# Patient Record
Sex: FEMALE | Race: WHITE
[De-identification: names, ages, dates, MRNs, and addresses within clinical notes are randomized per-mention and may not be internally consistent; named-entity substitution may affect disease eponyms.]

---

## 2018-04-11 ENCOUNTER — HOSPITAL ENCOUNTER (EMERGENCY)
Dept: HOSPITAL 25 - ED | Age: 83
Discharge: LEFT BEFORE DISCHARGE | End: 2018-04-11
Payer: MEDICARE

## 2018-04-11 VITALS — SYSTOLIC BLOOD PRESSURE: 138 MMHG | DIASTOLIC BLOOD PRESSURE: 57 MMHG

## 2018-04-11 DIAGNOSIS — R53.1: ICD-10-CM

## 2018-04-11 DIAGNOSIS — R19.7: ICD-10-CM

## 2018-04-11 DIAGNOSIS — R11.10: Primary | ICD-10-CM

## 2018-04-11 DIAGNOSIS — Z53.21: ICD-10-CM

## 2018-05-03 ENCOUNTER — HOSPITAL ENCOUNTER (EMERGENCY)
Dept: HOSPITAL 25 - ED | Age: 83
Discharge: NURSING, REHAB OR LONG-TERM CARE | End: 2018-05-03
Payer: MEDICARE

## 2018-05-03 VITALS — DIASTOLIC BLOOD PRESSURE: 44 MMHG | SYSTOLIC BLOOD PRESSURE: 121 MMHG

## 2018-05-03 DIAGNOSIS — Z87.19: ICD-10-CM

## 2018-05-03 DIAGNOSIS — Z87.891: ICD-10-CM

## 2018-05-03 DIAGNOSIS — R10.32: ICD-10-CM

## 2018-05-03 DIAGNOSIS — K57.92: Primary | ICD-10-CM

## 2018-05-03 LAB
BASOPHILS # BLD AUTO: 0.1 10^3/UL (ref 0–0.2)
EOSINOPHIL # BLD AUTO: 0.1 10^3/UL (ref 0–0.6)
HCT VFR BLD AUTO: 37 % (ref 35–47)
HGB BLD-MCNC: 12.6 G/DL (ref 12–16)
LYMPHOCYTES # BLD AUTO: 1.6 10^3/UL (ref 1–4.8)
MCH RBC QN AUTO: 29 PG (ref 27–31)
MCHC RBC AUTO-ENTMCNC: 34 G/DL (ref 31–36)
MCV RBC AUTO: 85 FL (ref 80–97)
MONOCYTES # BLD AUTO: 0.6 10^3/UL (ref 0–0.8)
NEUTROPHILS # BLD AUTO: 8.8 10^3/UL (ref 1.5–7.7)
NRBC # BLD AUTO: 0 10^3/UL
NRBC BLD QL AUTO: 0
PLATELET # BLD AUTO: 224 10^3/UL (ref 150–450)
RBC # BLD AUTO: 4.36 10^6/UL (ref 4–5.4)
WBC # BLD AUTO: 11.1 10^3/UL (ref 3.5–10.8)

## 2018-05-03 PROCEDURE — 86140 C-REACTIVE PROTEIN: CPT

## 2018-05-03 PROCEDURE — 85025 COMPLETE CBC W/AUTO DIFF WBC: CPT

## 2018-05-03 PROCEDURE — 36415 COLL VENOUS BLD VENIPUNCTURE: CPT

## 2018-05-03 PROCEDURE — 80053 COMPREHEN METABOLIC PANEL: CPT

## 2018-05-03 PROCEDURE — 83605 ASSAY OF LACTIC ACID: CPT

## 2018-05-03 PROCEDURE — 83690 ASSAY OF LIPASE: CPT

## 2018-05-03 PROCEDURE — 74177 CT ABD & PELVIS W/CONTRAST: CPT

## 2018-05-03 PROCEDURE — 99282 EMERGENCY DEPT VISIT SF MDM: CPT

## 2018-05-03 NOTE — RAD
INDICATION: Left lower quadrant pain     



COMPARISON: CT abdomen and pelvis February 20, 2007

 

TECHNIQUE: Axial source images were obtained from the hemidiaphragms to the symphysis

pubis following administration intravenous contrast.  88 cc Omnipaque 300 was utilized.

Coronal and sagittal reconstructed images were acquired.



Lung bases: There is mild airspace disease in the lingular segment. If there are chest

complaints, suggest a follow-up chest x-ray.



Liver: The liver is normal in size. There are no masses. There is no ductal dilatation.



Gallbladder: There are no calcified gallstones. There is no evidence of wall thickening or

pericholecystic fluid.



Spleen: The spleen is normal in size. There are no masses.



Pancreas: There is no focal pancreatic mass or ductal dilatation.



Adrenal glands: There is no evidence of adrenal mass.



Kidneys: The kidneys are normal in size and position. There are prompt nephrograms and

there is prompt excretion bilaterally. There are no renal parenchymal masses. There is no

evidence of nephrolithiasis.



Adenopathy: There is no evidence of adenopathy by size criteria.



Fluid collections: There is a small amount of free fluid in the dependent portion of the

pelvis with mesenteric stranding.



Vessels:There are atherosclerotic changes involving the aorta and iliac vessels. There is

no focal aneurysm. The IVC appears normal.



GI tract: Evaluation GI tract is limited as no oral contrast was given. The upper GI tract

is grossly normal. There is moderate stool in the colon. There is bowel wall thickening

and perienteric edema at the level the sigmoid colon. There are numerous sigmoid

diverticula. There is no obstruction. There is no free intraperitoneal air The CT findings

suggest acute diverticulitis. Imaging is limited in part due to beam hardening artifact

from the patient's right hip prosthesis. In addition, there is motion artifact.



Pelvic organs: Not well evaluated due to beam hardening artifact



Bladder: Not well evaluated due to beam hardening artifact.



Abdominal and pelvic soft tissues: The extraperitoneal abdominal and pelvic soft tissues

appear normal..



Osseous structures: There are no acute osseous findings.



Other: None



IMPRESSION: MILDLY LIMITED STUDY FOR THE REASONS OUTLINED ABOVE. CT FINDINGS CONSISTENT

WITH ACUTE DIVERTICULITIS OF THE SIGMOID COLON. NO OBSTRUCTION OR PERFORATION.

## 2018-05-03 NOTE — XMS REPORT
Zayda Crane

 Created on:2018



Patient:Zayda Crane

Sex:Female

:1935

External Reference #:[id number]





Demographics







 Address  [address]



   [place] [address]

 

 Home Phone  [phone]

 

 Email Address  [email or web address]

 

 Preferred Language  English

 

 Marital Status  Not  Or 

 

 Adventism Affiliation  Unknown

 

 Race  White

 

 Ethnic Group  Not  Or 









Author







 Organization  [facility]

 

 Address  [address] [address]

 

 Phone  [phone]









Support







 Name  Relationship  Address  Phone

 

 Rashaun Crane  Unavailable  [address]  [phone]



     [place] [address]  









Care Team Providers







 Name  Role  Phone

 

 Reanna Montes MD  Primary Care Physician  Unavailable









Payers







 Type  Date  Identification Numbers  Payment Provider  Subscriber

 

 Medicare Primary    Policy Number: [id number]  Medicare  Zayda Crane









 PayID: [id number]  [address]









 [place] [address]









 Medigap Part B    Policy Number: [id number]  [facility]  Gasper Crane









 PayID: [id number]  [address]









 [place] [address]







Problems







 Date  Description  Provider  Status

 

 Onset: 2011  Benign essential hypertension  Glenis Kent M.D., FACP  
Active

 

 Onset: 2011  Gastroesophageal reflux disease  Glenis Kent M.D., FACP  
Active

 

 Onset: 2011  Hyperlipidemia  Glenis Kent M.D., FACP  Active

 

 Onset: 2011  Osteochondropathy  Glenis Kent M.D., FACP  Active

 

 Onset: 2015  Essential hypertension  Dasha Briones N.KIRAN  Active

 

 Onset: 10/23/2015  Localized, primary osteoarthritis of  Paola Alves M.D.  
Active



   the pelvic region and thigh    

 

 Onset: 11/15/2015  Pathological fracture, right femur,  Paola Alves M.D.  
Active



   init encntr for fracture    







Family History







 Date  Family Member(s)  Problem(s)  Comments

 

   General  Heart Disease  

 

 :  (age 67 Years)  Father   due to Heart Disease  

 

 :  (age 51 Years)  Mother   due to Progressive  



     paralytic disease  

 

   First Daughter  Alive And Well  Age 57

 

   First Sister  Dementia  Age 81

 

   Second Sister  Unknown  Age 78







Social History







 Type  Date  Description  Comments

 

 Marital Status     2 Times  

 

 Lives With      

 

 Occupation    Retired  

 

 Cigarette Use    Quit 50 Years Ago  

 

 ETOH Use    Occasionally consumes alcohol  

 

 Smoking    Patient is a former smoker  

 

 Exercise Type/Frequency    Exercises regularly  







Allergies, Adverse Reactions, Alerts







 Date  Description  Reaction  Status  Severity  Comments

 

 2011  Ultram  unknown  active    

 

 2011  Sodium Pentathol  unknown  active    

 

 2011  Themeresol  unknown  active    







Medications







 Medication  Date  Status  Form  Strength  Qnty  SIG  Indications  Ordering



                 Provider

 

 Donepezil HCL  /  Active  Tablets  23mg  90tab  one by  R41.81  Dasha



   2018        s  mouth daily    Varn, N.P.

 

 D- Maximum  10/25/  Active  Tablets  2000Unit        Dasha



 Strength                Varn, N.P.

 

 Lorazepam  /  Active  Tablets  0.5mg  20tab  one by  R41.81  Dasha



           s  mouth twice    Varn, N.P.



             a day as    



             needed for    



             muscle    



             spasm    

 

 Amoxicillin  /  Active  Capsules  500mg  8caps  4 tablets 1    Paola



   2016          hour before    Long,



             dental work    M.DBipin

 

 Naproxen  /  Active  Tablets  500mg  60tab  1 tablet by    Steffanie



   2016        s  mouth with    Bordoni,



             food twice    NP



             daily as    



             needed    

 

 Fosamax  /  Active  Tablets  70mg  12tab  1 by mouth    Dasha



   2016        s  qwkly    Varn, N.P.

 

 Omeprazole  /  Active  Capsules  20mg  90cap  take one  M85.89  Dasha



       DR trinh  capsule by    Varn, N.P.



             mouth once    



             daily    

 

 Walker  10/23/  Active  Misc      rolling  M16.0  Paola



             Long alexandra,



             severe r    M.DBipin



             hip oa    

 

 Valium  /  Active  Tablets  5mg  2tabs  1 by mouth    Other



             po prn    Ordering



             anxiety    Provider

 

 Maalox/2%  /  Active    Equal  120cc  5 ml q 6    Dasha



 Viscous  2015      Parts    hrs prn    Varn, N.P.



 Lidocaine/Benadr                



 yl                

 

 Zofran Odt  /  Active  Tablets  4mg  30tab  every 4 to    Glenis2014    Dispers    s  6 hours as    marianne Kent M.D., FACP

 

 Asa  10/07/  Active    81mg    2 po qday    Glenis



                 MERCED Kent, FACP

 

 Nitrostat  /  Active  Tablets  0.4mg  1bott  one sl  786.59  Glenis



   2012    Sub    les  q5min up to    Yoli,



             3 doses as    M.DBipin, FACP



             needed    

 

 Lipitor  /  Active  Tablets  20mg  90tab  Take One    Dasha



   2012        s  Tablet By    Varn, N.P.



             Mouth Once    



             Daily    

 

 Calcium + D3  /  Active  Tablets  600-200mg    1 by mouth    Unknown



   0000      -Unit    q 12 hours    

 

 Risperdal  /  Active  Tablets  0.25mg    1 tab by    Unknown



   0000          mouth a day    



             as needed    

 

 Losartan  /  Active  Tablets  50mg  90tab  Take One    Dasha



 Potassium  0000        s  Tablet By    Varn, N.P.



             Mouth Once    



             Daily    

 

                 

 

 Donepezil HCL  /  Hx  Tablets  23mg  90tab  one by  R41.81  Dasha



    -        s  mouth daily    Varn, N.P.



   2018              

 

 Donepezil HCL  08/15/  Hx  Tablets  10mg  90tab  1 by mouth    Dasha



    -        s  every day    Varn, N.P.



   2017              

 

 Vitamin D3  /  Hx  Capsules  70150Lgvl  8caps  one by    Dasha



    -          mouth once    Varn, N.P.



   02/15/          weekly    



   2016              

 

 Warfarin Sodium  /  Hx  Tablets  2mg  60tab  take 1-3    Myrtle



    -        s  tabs at    MD Leena



   /          dinner time    



             or as    



             instructed    

 

 Hydrocodone-Acet  04/15/  Hx  Tablets  5-325mg  90tab  1 by mouth    Paola



 aminophen   -        s  three times    Long,



   11/10/          a day as    M.CATREINA



             needed for    



             pain    

 

 Aricept  /  Hx  Tablets  5mg  30tab  1 by mouth  780.93  Dasha



    -        s  every day    Varn, N.P.



   2015              

 

 Namenda  /  Hx  Tablets  5mg  30tab  one po  780.93  Dasha



    -        s  daily    Varn, N.P.



   2014              

 

 Valium  /  Hx  Tablets  10mg  1tabs  1 tablet by  724.3  Dasha



   2014 -          mouth 30    Varn, N.P.



   /          minutes    



   2014          before mri    

 

 Ergocalciferol  /  Hx  Capsules  55327Cswd  8caps  1 cap by    Glenis



    -          mouth every    Yoli,



             week    M.D., FACP



                 

 

 Vagifem  /  Hx  Tablets  10mcg  24tab  Insert 1    Glenis



    -        s  Tablet Per    Yoli,



   /          Vag[facility]    [facility]



             Twice A    



             Week (With    



             Applicator)    

 

 Vagifem  /  Hx  Tablets  25mcg  24tab  per vagina    Glenis



    -        s  twice a    Yoli,



             week (with    [facility]



             applicator)    

 

 Budeprion XL  /  Hx    300mg  90uni  take 1    Glenis



   0000 -        ts  daily    Yoli,



   /              M.D., FACP



                 

 

 Promethegan  /  Hx    12.5mg        Yoli,



    -              MD Glenis



   2011              

 

 Lipitor  /  Hx  Tablets  20mg  90tab  Take One    Glenis



   0000 -        s  Tablet By    Yoli,



   /          Mouth Once    M.D., FACP



             Daily    

 

 Prilosec  /  Hx  Capsules  20mg  30cap  1 po qd    Unknown



   0000 -    DR trinh      



   08/15/              



   2016              

 

 Vivelle-Dot  /  Hx  Patches  0.05mg/24  24uni  apply    Dasha



   0000 -    Biweek  HR  ts  topically    Varn, N.P.



   /          two times a    



             week    

 

 Pantoprazole  /  Hx  Tablets  40mg  90tab  Not taking    Dasha



 Sodium  0000 -    DR trinh  4/15/15    Anali, N.P.



   2015              

 

 Glucosamine  00/  Hx  Capsules  1500Com    2 by mouth    Unknown



 Chondroitin 1500  0000 -          every day    



 Complex  08/15/              



   2016              

 

 Colace  00/  Hx  Capsules  100mg    1 by mouth    Unknown



   0000 -          once a day    



   2016              

 

 Oxycodone-Acetam  00/  Hx  Tablets  5-325mg    up to three    Unknown



 inophen  0000 -          tablets a    



             day as    



   2016          needed for    



             pain.    

 

 Donepezil HCL  00/00/  Hx  Tablets  5mg    1 every day    Unknown



   0000 -    Dispers          



   08/15/              



   2016              

 

 Aricept  /00/  Hx            Unknown



   0000 -              



   10/25/              



   2017              







Medications Administered in Office







 Medication  Date  Status  Form  Strength  Qnty  SIG  Indications  Ordering



                 Provider

 

 Depomedrol  06/15/2  Administered  Injection          Dirk Yeimy,



 80MG  015              M.D.

 

 Depomedrol    Administered  Injection          Irina



 80MG  015              Liptak,



                 RPA-C







Immunizations







 CPT Code  Status  Date  Vaccine  Reaction  Lot #

 

 [id number]  Given  10/24/2016  Pneumococcal Conjugate  no reaction noted ..  [id number]



       Vaccine 13 Valent For  hh  



       Intramuscular Use    

 

   Given  2016  Flu Vaccine NOS    

 

 87369  Given  2015  Influenza Virus Vaccine,    [id number]



       Quadrivalent, Split,    



       Preservative Free    

 

 64879  Given  2015  Pneumococcal Conjugate    [id number]



       Vaccine 13 Valent For    



       Intramuscular Use    

 

 26252  Given  2014  Fluzone High Dose    

 

   Given  2013  Afluria Vaccine    

 

   Given  2012  Afluria Vaccine    

 

 37114  Given  2011  Pneumonia Vaccine    [id number]

 

 85846  Given  2009  Influenza Virus 3Yrs &amp;    



       Over    

 

 64653  Given  10/23/2008  Influenza Virus 3Yrs &amp;    



       Over    

 

 22818  Given  10/23/2008  Influenza Virus 3Yrs &amp;    



       Over    

 

 08947  Given  2008  Tetanus And Diptheria (Td)    



       For Adult Use Preservative    



       Free    

 

 59223  Given  2007  Zoster (Zostavax)    

 

 34315  Given  2007  Zoster (Zostavax)    







Vital Signs







 Date  Vital  Result  Comment

 

 2018  Weight  151.50 lb  









 Heart Rate  65 /min  

 

 BP Systolic  128 mmHg  

 

 BP Diastolic  60 mmHg  

 

 Body Temperature  97.0 F  

 

 O2 % BldC Oximetry  96 %  









 10/25/2017  Height  60.75 inches  5'0.75"









 Weight  159.75 lb  

 

 Heart Rate  66 /min  

 

 BP Systolic  114 mmHg  

 

 BP Diastolic  60 mmHg  

 

 Body Temperature  98.0 F  

 

 O2 % BldC Oximetry  96 %  

 

 BMI (Body Mass Index)  30.4 kg/m2  









 2017  Weight  163.25 lb  









 Heart Rate  80 /min  

 

 BP Systolic Sitting  120 mmHg  

 

 BP Diastolic Sitting  72 mmHg  

 

 Respiratory Rate  20 /min  

 

 Body Temperature  96.5 F  

 

 Pain Level  0  









 2017  Weight  167.75 lb  









 Heart Rate  76 /min  

 

 BP Systolic  114 mmHg  

 

 BP Diastolic  62 mmHg  

 

 Body Temperature  96.7 F  

 

 O2 % BldC Oximetry  97 %  









 2017  Weight  166.00 lb  









 Heart Rate  73 /min  

 

 BP Systolic Sitting  120 mmHg  

 

 BP Diastolic Sitting  70 mmHg  

 

 Body Temperature  98.1 F  

 

 O2 % BldC Oximetry  98 %  









 2016  Heart Rate  80 /min  









 Respiratory Rate  20 /min  

 

 Pain Level  0  









 10/24/2016  Height  61 inches  5'1"









 Weight  168.00 lb  

 

 Heart Rate  82 /min  

 

 BP Systolic Sitting  138 mmHg  

 

 BP Diastolic Sitting  80 mmHg  

 

 Respiratory Rate  15 /min  

 

 O2 % BldC Oximetry  98 %  

 

 BMI (Body Mass Index)  31.7 kg/m2  









 2016  Weight  170.00 lb  









 Heart Rate  80 /min  

 

 BP Systolic Sitting  130 mmHg  

 

 BP Diastolic Sitting  82 mmHg  

 

 Respiratory Rate  15 /min  

 

 Body Temperature  98.2 F  

 

 O2 % BldC Oximetry  97 %  









 08/15/2016  Weight  170.50 lb  









 Heart Rate  68 /min  

 

 BP Systolic Sitting  127 mmHg  

 

 BP Diastolic Sitting  72 mmHg  

 

 O2 % BldC Oximetry  94 %  









 2016  Height  60 inches  5'0"









 Weight  176.00 lb  

 

 Heart Rate  82 /min  

 

 BP Systolic  132 mmHg  

 

 BP Diastolic  84 mmHg  

 

 Respiratory Rate  15 /min  

 

 Body Temperature  98.2 F  

 

 O2 % BldC Oximetry  98 %  

 

 BMI (Body Mass Index)  34.4 kg/m2  









 2016  Height  60 inches  5'0"









 Weight  182.00 lb  

 

 Pain Level  0  

 

 BMI (Body Mass Index)  35.5 kg/m2  









 2016  Height  60.5 inches  5'0.50"









 Weight  182.00 lb  

 

 Heart Rate  82 /min  

 

 BP Systolic  104 mmHg  

 

 BP Diastolic  78 mmHg  

 

 BMI (Body Mass Index)  35.0 kg/m2  









 2015  Height  60.5 inches  5'0.50"









 Weight  182.00 lb  

 

 BMI (Body Mass Index)  35.0 kg/m2  









 2015  Weight  182.25 lb  









 Heart Rate  80 /min  

 

 BP Systolic Sitting  109 mmHg  

 

 BP Diastolic Sitting  62 mmHg  

 

 Body Temperature  98.0 F  

 

 O2 % BldC Oximetry  97 %  









 2015  Height  60.5 inches  5'0.50"









 Weight  192.00 lb  

 

 Pain Level  9  

 

 BMI (Body Mass Index)  36.9 kg/m2  









 10/23/2015  Height  60.5 inches  5'0.50"









 Weight  192.00 lb  

 

 Pain Level  9  

 

 BMI (Body Mass Index)  36.9 kg/m2  









 2015  Height  60.5 inches  5'0.50"









 Weight  192.00 lb  

 

 Heart Rate  77 /min  

 

 BP Systolic Sitting  118 mmHg  

 

 BP Diastolic Sitting  62 mmHg  

 

 Body Temperature  97.2 F  

 

 BMI (Body Mass Index)  36.9 kg/m2  









 06/15/2015  Height  63 inches  5'3"









 Weight  195.00 lb  

 

 Pain Level  8  

 

 BMI (Body Mass Index)  34.5 kg/m2  









 2015  Height  63 inches  5'3"









 Weight  195.00 lb  

 

 Pain Level  6  

 

 BMI (Body Mass Index)  34.5 kg/m2  









 04/15/2015  Height  78 inches  6'6"









 Weight  195.00 lb  

 

 Heart Rate  63 /min  

 

 BP Systolic Sitting  107 mmHg  

 

 BP Diastolic Sitting  72 mmHg  

 

 Pain Level  8  

 

 BMI (Body Mass Index)  22.5 kg/m2  









 2015  Weight  195.00 lb  









 Heart Rate  70 /min  

 

 BP Systolic Sitting  132 mmHg  

 

 BP Diastolic Sitting  72 mmHg  

 

 Body Temperature  96.4 F  









 2014  Height  61 inches  5'1"









 Weight  191.25 lb  

 

 Heart Rate  72 /min  

 

 BP Systolic Sitting  118 mmHg  

 

 BP Diastolic Sitting  70 mmHg  

 

 BMI (Body Mass Index)  36.1 kg/m2  









 2014  Height  61 inches  5'1"









 Weight  190.00 lb  

 

 Heart Rate  72 /min  

 

 BP Systolic Sitting  110 mmHg  

 

 BP Diastolic Sitting  62 mmHg  

 

 Body Temperature  97.7 F  

 

 BMI (Body Mass Index)  35.9 kg/m2  









 2014  Weight  190.50 lb  









 Heart Rate  80 /min  

 

 BP Systolic  120 mmHg  

 

 BP Diastolic  58 mmHg  









 10/07/2013  Weight  196.25 lb  









 Heart Rate  68 /min  

 

 BP Systolic  128 mmHg  

 

 BP Diastolic  68 mmHg  









 2012  Height  62 inches  5'2"









 Weight  197.00 lb  

 

 Heart Rate  76 /min  

 

 BP Systolic Sitting  140 mmHg  

 

 BP Diastolic Sitting  66 mmHg  

 

 BMI (Body Mass Index)  36.0 kg/m2  









 2012  Height  62 inches  5'2"









 Weight  191.50 lb  

 

 Heart Rate  72 /min  

 

 BP Systolic Sitting  102 mmHg  

 

 BP Diastolic Sitting  60 mmHg  

 

 BMI (Body Mass Index)  35.0 kg/m2  









 2012  Height  62 inches  5'2"









 Weight  194.00 lb  

 

 Heart Rate  68 /min  

 

 BP Systolic Sitting  124 mmHg  

 

 BP Diastolic Sitting  72 mmHg  

 

 BMI (Body Mass Index)  35.5 kg/m2  









 2011  Height  62 inches  5'2"









 Weight  190.00 lb  

 

 Heart Rate  60 /min  

 

 BP Systolic Sitting  120 mmHg  L

 

 BP Diastolic Sitting  62 mmHg  L

 

 BMI (Body Mass Index)  34.7 kg/m2  









 2011  Weight  185.00 lb  









 Heart Rate  78 /min  

 

 BP Systolic Sitting  124 mmHg  

 

 BP Diastolic Sitting  60 mmHg  







Results







 Test  Date  Test  Result  H/L  Range  Note

 

 Lipid Profile (Trig/Chol/HDL)  2018  Triglycerides  139 mg/dL      1









 Cholesterol  189 mg/dL      2

 

 HDL Cholesterol  37.5 mg/dL      3

 

 LDL Cholesterol  124 mg/dL      4









 Comp Metabolic Panel  2018  Sodium  141 mmol/L    139-145  









 Potassium  4.1 mmol/L    3.5-5.0  

 

 Chloride  106 mmol/L    101-111  

 

 Co2 Carbon Dioxide  30 mmol/L    22-32  

 

 Anion Gap  5 mmol/L    2-11  

 

 Glucose  82 mg/dL      

 

 Blood Urea Nitrogen  13 mg/dL    6-24  

 

 Creatinine  0.90 mg/dL    0.51-0.95  

 

 BUN/Creatinine Ratio  14.4    8-20  

 

 Calcium  10.0 mg/dL    8.6-10.3  

 

 Total Protein  6.4 g/dL    6.4-8.9  

 

 Albumin  4.0 g/dL    3.2-5.2  

 

 Globulin  2.4 g/dL    2-4  

 

 Albumin/Globulin Ratio  1.7    1-3  

 

 Total Bilirubin  0.50 mg/dL    0.2-1.0  

 

 Alkaline Phosphatase  81 U/L      

 

 Alt  8 U/L    7-52  

 

 Ast  13 U/L    13-39  

 

 Egfr Non-  60.2    &gt;60  

 

 Egfr   77.5    &gt;60  5









 Urine Culture And Sensitivities  2017  Urine Culture  SEE RESULT BELOW  
    6, 7

 

 Urinalysis Profile  2017  Urine Color  Yellow      6









 Urine Appearance  Cloudy      6

 

 Urine Specific Gravity  1.023    1.010-1.030  6

 

 Urine pH  6.0    5-9  6

 

 Urine Urobilinogen  Negative    Negative  6

 

 Urine Ketones  Negative    Negative  6

 

 Urine Protein  Negative    Negative  6

 

 Urine Leukocytes  3+    Negative  6

 

 Urine Blood  Negative    Negative  6

 

 *  *    Negative  6, 8

 

 Urine Nitrite  Negative    Negative  6

 

 Urine Bilirubin  Negative    Negative  6

 

 Urine Glucose  Negative    Negative  6

 

 Urine White Blood Cell  2+(11-20/hpf)    Absent  6

 

 Urine Red Blood Cell  Absent    Absent  6

 

 Urine Bacteria  Absent    Absent  6

 

 Urine Squamous Epithelial Cell  Present    Absent  6









 CBC Auto Diff  2017  White Blood Count  7.8 10^3/uL    3.5-10.8  









 Red Blood Count  4.73 10^6/uL    4.0-5.4  

 

 Hemoglobin  13.4 g/dL    12.0-16.0  

 

 Hematocrit  41 %    35-47  

 

 Mean Corpuscular Volume  86 fL    80-97  

 

 Mean Corpuscular Hemoglobin  28 pg    27-31  

 

 Mean Corpuscular HGB Conc  33 g/dL    31-36  

 

 Red Cell Distribution Width  14 %    10.5-15  

 

 Platelet Count  228 10^3/uL    150-450  

 

 Mean Platelet Volume  9 um3    7.4-10.4  

 

 Abs Neutrophils  5.0 10^3/uL    1.5-7.7  

 

 Abs Lymphocytes  2.1 10^3/uL    1.0-4.8  

 

 Abs Monocytes  0.4 10^3/uL    0-0.8  

 

 Abs Eosinophils  0.2 10^3/uL    0-0.6  

 

 Abs Basophils  0.1 10^3/uL    0-0.2  

 

 Abs Nucleated RBC  0 10^3/uL      

 

 Granulocyte %  63.9 %    38-83  

 

 Lymphocyte %  27.3 %    25-47  

 

 Monocyte %  5.3 %    1-9  

 

 Eosinophil %  2.2 %    0-6  

 

 Basophil %  1.3 %    0-2  

 

 Nucleated Red Blood Cells %  0      









 Lipid Profile (Trig/Chol/HDL)  2017  Triglycerides  198 mg/dL      9









 Cholesterol  197 mg/dL      10

 

 HDL Cholesterol  34.2 mg/dL      11

 

 LDL Cholesterol  123 mg/dL      12









 Comp Metabolic Panel  2017  Sodium  137 mmol/L    133-145  









 Potassium  4.2 mmol/L    3.5-5.0  

 

 Chloride  105 mmol/L    101-111  

 

 Co2 Carbon Dioxide  29 mmol/L    22-32  

 

 Anion Gap  3 mmol/L    2-11  

 

 Glucose  94 mg/dL      

 

 Blood Urea Nitrogen  10 mg/dL    6-24  

 

 Creatinine  0.76 mg/dL    0.51-0.95  

 

 BUN/Creatinine Ratio  13.2    8-20  

 

 Calcium  9.5 mg/dL    8.6-10.3  

 

 Total Protein  6.4 g/dL    6.4-8.9  

 

 Albumin  3.9 g/dL    3.2-5.2  

 

 Globulin  2.5 g/dL    2-4  

 

 Albumin/Globulin Ratio  1.6    1-3  

 

 Total Bilirubin  0.50 mg/dL    0.2-1.0  

 

 Alkaline Phosphatase  78 U/L      

 

 Alt  9 U/L    7-52  

 

 Ast  14 U/L    13-39  

 

 Egfr Non-  73.2    &gt;60  

 

 Egfr   94.2    &gt;60  13









 Lipid Profile (Trig/Chol/HDL)  2016  Triglycerides  162 mg/dL      14, 15









 Cholesterol  212 mg/dL      14, 16

 

 HDL Cholesterol  35.4 mg/dL      14, 17

 

 LDL Cholesterol  144 mg/dL      14, 18









 Comp Metabolic Panel  2016  Sodium  137 mmol/L    133-145  14









 Potassium  4.1 mmol/L    3.5-5.0  14

 

 Chloride  105 mmol/L    101-111  14

 

 Co2 Carbon Dioxide  27 mmol/L    22-32  14

 

 Anion Gap  5 mmol/L    2-11  14

 

 Glucose  92 mg/dL      14

 

 Blood Urea Nitrogen  19 mg/dL    6-24  14

 

 Creatinine  0.88 mg/dL    0.51-0.95  14

 

 BUN/Creatinine Ratio  21.6  High  8-20  14

 

 Calcium  9.9 mg/dL    8.6-10.3  14

 

 Total Protein  6.5 g/dL    6.4-8.9  14

 

 Albumin  4.1 g/dL    3.2-5.2  14

 

 Globulin  2.4 g/dL    2-4  14

 

 Albumin/Globulin Ratio  1.7    1-3  14

 

 Total Bilirubin  0.40 mg/dL    0.2-1.0  14

 

 Alkaline Phosphatase  76 U/L      14

 

 Alt  7 U/L    7-52  14

 

 Ast  12 U/L  Low  13-39  14

 

 Egfr Non-  62.1    &gt;60  14

 

 Egfr   79.9    &gt;60  14, 19









 Laboratory test finding  2016  Vitamin D Total 25(Oh)  40.7 ng/mL    30-
50  

 

 Pthi  12/10/2015  Calcium (PTH Intact)  10.0 mg/dL    8.6-10.3  









 PTH Intact  6.9 pmol/L    1.3-9.3  









 Laboratory test finding  12/10/2015  TSH (Thyroid Stim  1.74 ?IU/mL    0.34-
5.60  



     Horm)        

 

 Laboratory test finding  2015  Vitamin D Total 25(Oh)  28.3 ng/mL  Low  
30-50  

 

 Comp Metabolic Panel  2015  Sodium  139 mmol/L    133-145  









 Potassium  3.7 mmol/L    3.5-5.0  

 

 Chloride  105 mmol/L    101-111  

 

 Co2 Carbon Dioxide  28 mmol/L    22-32  

 

 Anion Gap  6 mmol/L    2-11  

 

 Glucose  101 mg/dL  High    

 

 Blood Urea Nitrogen  9 mg/dL    6-24  

 

 Creatinine  0.87 mg/dL    0.51-0.95  

 

 BUN/Creatinine Ratio  10.3    8-20  

 

 Calcium  10.1 mg/dL    8.6-10.3  

 

 Total Protein  6.5 g/dL    6.4-8.9  

 

 Albumin  3.9 g/dL    3.2-5.2  

 

 Globulin  2.6 g/dL    2-4  

 

 Albumin/Globulin Ratio  1.5    1-3  

 

 Total Bilirubin  0.40 mg/dL    0.2-1.0  

 

 Alkaline Phosphatase  94 U/L      

 

 Alt  13 U/L    7-52  

 

 Ast  13 U/L    13-39  

 

 Egfr Non-  63.0    &gt;60  

 

 Egfr   81.0    &gt;60  20









 Quantiferon Gold TB  2015  M tuberculosis by Quantiferon  Negative    
Negative  









 Tuberculosis Antigen Value  0.00 IU/mL      21









 Laboratory test finding  2015  TSH (Thyroid Stim  1.34 ?IU/mL    0.34-
5.60  



     Horm)        

 

 CBC Auto Diff  2015  White Blood Count  15.4 10^3/uL  High  4.8-10.8  









 Red Blood Count  4.62 10^6/uL    4.0-5.4  

 

 Hemoglobin  12.9 g/dL    12.0-16.0  

 

 Hematocrit  40 %    35-47  

 

 Mean Corpuscular Volume  86 fL    80-97  

 

 Mean Corpuscular Hemoglobin  28 pg    27-31  

 

 Mean Corpuscular HGB Conc  32 g/dL    31-36  

 

 Red Cell Distribution Width  13 %    10.5-15  

 

 Platelet Count  252 10^3/uL    150-450  

 

 Mean Platelet Volume  8 um3    7.4-10.4  

 

 Abs Neutrophils  13.4 10^3/uL  High  1.5-7.7  

 

 Abs Lymphocytes  1.4 10^3/uL    1.0-4.8  

 

 Abs Monocytes  0.6 10^3/uL    0-0.8  

 

 Abs Eosinophils  0 10^3/uL    0-0.6  

 

 Abs Basophils  0.1 10^3/uL    0-0.2  

 

 Abs Nucleated RBC  0.01 10^3/uL      

 

 Granulocyte %  86.6 %  High  38-83  

 

 Lymphocyte %  9.1 %  Low  25-47  

 

 Monocyte %  3.7 %    1-9  

 

 Eosinophil %  0.2 %    0-6  

 

 Basophil %  0.4 %    0-2  

 

 Nucleated Red Blood Cells %  0.1      









 Comp Metabolic Panel  2015  Sodium  135 mmol/L    133-145  









 Potassium  3.6 mmol/L    3.5-5.0  

 

 Chloride  101 mmol/L    101-111  

 

 Co2 Carbon Dioxide  27 mmol/L    22-32  

 

 Anion Gap  7 mmol/L    2-11  

 

 Glucose  116 mg/dL  High    

 

 Blood Urea Nitrogen  14 mg/dL    6-24  

 

 Creatinine  1.01 mg/dL  High  0.51-0.95  

 

 BUN/Creatinine Ratio  13.9    8-20  

 

 Calcium  10.5 mg/dL  High  8.6-10.3  

 

 Total Protein  7.4 g/dL    6.4-8.9  

 

 Albumin  4.1 g/dL    3.2-5.2  

 

 Globulin  3.3 g/dL    2-4  

 

 Albumin/Globulin Ratio  1.2    1-3  

 

 Total Bilirubin  0.50 mg/dL    0.2-1.0  

 

 Alkaline Phosphatase  141 U/L  High    

 

 Alt  12 U/L    7-52  

 

 Ast  17 U/L    13-39  

 

 Egfr Non-  53.0    &gt;60  

 

 Egfr   68.2    &gt;60  22









 Inr/Protime  2015  Inr  0.98    0.89-1.11  23

 

 Laboratory test finding  2015  Partial Thrombo Time  30.2 seconds    26.0
-36.3  



     PTT        









 Troponin-I (TnI)  0.01 ng/mL    &lt;0.03  24









 Type &amp; Screen  2015  Patient Blood Type  A Positive      









 Antibody Screen  NEGATIVE      









 Laboratory test finding  10/26/2015  Body Fluid C&amp;S  SEE RESULT BELOW      
25

 

 Body Fluid Cell Count  10/26/2015  Body Fluid Source  Synovial Fluid      









 Body Fluid Appearance  Cloudy      

 

 Body Fluid Color  Yellow      

 

 Body Fluid Volume  2 mL      

 

 Body Fluid WBC  1218      

 

 Body Fluid RBC  3569      

 

 Body Fluid Neutrophils  88      

 

 Body Fluid Lymph  11      

 

 Body Fluid Mono  1      

 

 Body Fluid Total Cells Counted  100      

 

 Fluid Reviewed By MD  (SEE NOTE)      26









 Laboratory test finding  2015  C Reactive Protein  4.23 mg/L    &lt; 
5.00  27

 

 CBC Auto Diff  2015  White Blood Count  15.6 10^3/uL  High  4.8-10.8  









 Red Blood Count  4.67 10^6/uL    4.0-5.4  

 

 Hemoglobin  13.3 g/dL    12.0-16.0  

 

 Hematocrit  41 %    35-47  

 

 Mean Corpuscular Volume  89 fL    80-97  

 

 Mean Corpuscular Hemoglobin  29 pg    27-31  

 

 Mean Corpuscular HGB Conc  32 g/dL    31-36  

 

 Red Cell Distribution Width  14 %    10.5-15  

 

 Platelet Count  273 10^3/uL    150-450  

 

 Mean Platelet Volume  8 um3    7.4-10.4  

 

 Abs Neutrophils  11.6 10^3/uL  High  1.5-7.7  

 

 Abs Lymphocytes  2.8 10^3/uL    1.0-4.8  

 

 Abs Monocytes  1.0 10^3/uL  High  0-0.8  

 

 Abs Eosinophils  0.2 10^3/uL    0-0.6  

 

 Abs Basophils  0.1 10^3/uL    0-0.2  

 

 Abs Nucleated RBC  0.01 10^3/uL      

 

 Granulocyte %  74.3 %    38-83  

 

 Lymphocyte %  17.9 %  Low  25-47  

 

 Monocyte %  6.1 %    1-9  

 

 Eosinophil %  1.0 %    0-6  

 

 Basophil %  0.7 %    0-2  

 

 Nucleated Red Blood Cells %  0.1      









 Laboratory test finding  2015  Erythrocyte Sed Rate  20 mm/Hr    0-40  

 

 Lipid Profile (Trig/Chol/HDL)  2015  Triglycerides  125 mg/dL      28, 29









 Cholesterol  185 mg/dL      28, 30

 

 HDL Cholesterol  48.7 mg/dL      28, 31

 

 LDL Cholesterol  111 mg/dL      28, 32









 Comp Metabolic Panel  2015  Sodium  138 mmol/L    133-145  28









 Potassium  4.3 mmol/L    3.5-5.0  28

 

 Chloride  103 mmol/L    101-111  28

 

 Co2 Carbon Dioxide  30 mmol/L    22-32  28

 

 Anion Gap  5 mmol/L    2-11  28

 

 Glucose  83 mg/dL      28

 

 Blood Urea Nitrogen  13 mg/dL    6-24  28

 

 Creatinine  0.97 mg/dL  High  0.51-0.95  28

 

 BUN/Creatinine Ratio  13.4    8-20  28

 

 Calcium  9.8 mg/dL    8.6-10.3  28

 

 Total Protein  6.6 g/dL    6.4-8.9  28

 

 Albumin  3.9 g/dL    3.2-5.2  28

 

 Globulin  2.7 g/dL    2-4  28

 

 Albumin/Globulin Ratio  1.4    1-3  28

 

 Total Bilirubin  0.50 mg/dL    0.2-1.0  28

 

 Alkaline Phosphatase  81 U/L      28

 

 Alt  26 U/L    7-52  28

 

 Ast  25 U/L    13-39  28

 

 Egfr Non-  55.7    &gt;60  28

 

 Egfr   71.6    &gt;60  28, 33









 CBC Auto Diff  2014  White Blood Count  11.0 10^3/uL  High  4.8-10.8  









 Red Blood Count  4.70 10^6/uL    4.0-5.4  

 

 Hemoglobin  13.3 g/dL    12.0-16.0  

 

 Hematocrit  41 %    35-47  

 

 Mean Corpuscular Volume  87 fL    80-97  

 

 Mean Corpuscular Hemoglobin  28 pg    27-31  

 

 Mean Corpuscular HGB Conc  33 g/dL    31-36  

 

 Red Cell Distribution Width  14 %    10.5-15  

 

 Platelet Count  267 10^3/uL    150-450  

 

 Mean Platelet Volume  9 um3    7.4-10.4  

 

 Abs Neutrophils  8.5 10^3/uL  High  1.5-7.7  

 

 Abs Lymphocytes  1.9 10^3/uL    1.0-4.8  

 

 Abs Monocytes  0.5 10^3/uL    0-0.8  

 

 Abs Eosinophils  0.1 10^3/uL    0-0.6  

 

 Abs Basophils  0.1 10^3/uL    0-0.2  

 

 Abs Nucleated RBC  0.01 10^3/uL      

 

 Granulocyte %  77.0 %    38-83  

 

 Lymphocyte %  17.0 %  Low  25-47  

 

 Monocyte %  4.4 %    1-9  

 

 Eosinophil %  0.8 %    0-6  

 

 Basophil %  0.8 %    0-2  

 

 Nucleated Red Blood Cells %  0.1      









 Comp Metabolic Panel  2014  Sodium  138 mmol/L    133-145  









 Potassium  4.0 mmol/L    3.7-5.6  

 

 Chloride  104 mmol/L    101-111  

 

 Co2 Carbon Dioxide  28 mmol/L    22-32  

 

 Anion Gap  6 mmol/L    2-11  

 

 Glucose  86 mg/dL      

 

 Blood Urea Nitrogen  15 mg/dL    6-24  

 

 Creatinine  0.84 mg/dL    0.51-0.95  

 

 BUN/Creatinine Ratio  17.9    8-20  

 

 Calcium  10.0 mg/dL    8.6-10.3  

 

 Total Protein  7.0 g/dL    6.4-8.9  

 

 Albumin  4.3 g/dL    3.2-5.2  

 

 Globulin  2.7 g/dL    2-4  

 

 Albumin/Globulin Ratio  1.6    1-3  

 

 Total Bilirubin  0.40 mg/dL    0.2-1.0  

 

 Alkaline Phosphatase  78 U/L      

 

 Alt  11 U/L    7-52  

 

 Ast  16 U/L    13-39  

 

 Egfr Non-  65.9    &gt;60  

 

 Egfr   84.8    &gt;60  34









 Liver Function Panel  2014  Total Protein  5.9 g/dL  Low  6.2-8.1  









 Albumin  3.5 g/dL    3.2-5.2  

 

 Globulin  2.4 g/dL    2-4  

 

 Albumin/Globulin Ratio  1.5    1-3  

 

 Total Bilirubin  0.5 mg/dL    0.4-1.5  

 

 Direct Bilirubin  0.1 mg/dL    0.1-0.5  

 

 Indirect Bilirubin  0.4 mg/dL    0.3-1.0  

 

 Alkaline Phosphatase  82 U/L      

 

 Alt  13 U/L  Low  14-54  

 

 Ast  16 U/L    12-42  









 Lipid Profile (Trig/Chol/HDL)  2014  Triglycerides  166 mg/dL      









 Cholesterol  186 mg/dL    Less than 200  

 

 HDL Cholesterol  40 mg/dL    40-60  35

 

 Cholesterol/HDL Ratio  4.7 Average  High  1-4.44  

 

 LDL Cholesterol  112.8  High  Less Than 100  36









 Comp Metabolic Panel  2013  Sodium  139 mmol/L    133-145  









 Potassium  4.2 mmol/L    3.5-5.0  

 

 Chloride  106 mmol/L    101-111  

 

 Co2 Carbon Dioxide  28.0 mmol/L    22-32  

 

 Anion Gap  5.0 mmol/L    2-11  

 

 Glucose  84 mg/dL      

 

 Blood Urea Nitrogen  10 mg/dL    6-24  

 

 Creatinine  0.90 mg/dL    0.50-1.40  

 

 BUN/Creatinine Ratio  11.1    8-20  

 

 Calcium  9.9 mg/dL    8.1-9.9  

 

 Total Protein  6.2 g/dL    6.2-8.1  

 

 Albumin  3.6 g/dL    3.2-5.2  

 

 Globulin  2.6 g/dL    2-4  

 

 Albumin/Globulin Ratio  1.4    1-3  

 

 Total Bilirubin  0.5 mg/dL    0.4-1.5  

 

 Alkaline Phosphatase  84 U/L      

 

 Alt  13 U/L  Low  14-54  

 

 Ast  19 U/L    12-42  

 

 Egfr Non-  60.9    &gt;60  

 

 Egfr   78.3    &gt;60  37









 Lipid Profile (Trig/Chol/HDL)  2013  Triglycerides  176 mg/dL      









 Cholesterol  207 mg/dL  High  Less than 200  

 

 HDL Cholesterol  35 mg/dL  Low  40-60  38

 

 Cholesterol/HDL Ratio  5.9 Average  High  1-4.44  

 

 LDL Cholesterol  136.8  High  Less Than 100  39









 Blood Culture  2013  Blood Culture  (SEE NOTE)      40

 

 Blood Culture  2013  Blood Culture  (SEE NOTE)      41

 

 CBC Auto Diff  2013  White Blood Count  12.1 10^3/uL  High  4.8-10.8  









 Red Blood Count  4.17 10^6/uL    4.0-5.4  

 

 Hemoglobin  12.3 g/dL    12.0-16.0  

 

 Hematocrit  36 %    35-47  

 

 Mean Corpuscular Volume  87 fL    80-97  

 

 Mean Corpuscular Hemoglobin  30 pg    27-31  

 

 Mean Corpuscular HGB Conc  34 g/dL    31-36  

 

 Red Cell Distribution Width  14 %    10.5-15  

 

 Platelet Count  212 10^3/uL    150-450  

 

 Mean Platelet Volume  8 um3    7.4-10.4  

 

 Abs Neutrophils  10.3 10^3/uL  High  1.5-7.7  

 

 Abs Lymphocytes  1.4 10^3/uL    1.0-4.8  

 

 Abs Monocytes  0.3 10^3/uL    0-0.8  

 

 Abs Eosinophils  0.1 10^3/uL    0-0.6  

 

 Abs Basophils  0.1 10^3/uL    0-0.2  

 

 Abs Nucleated RBC  0.01 10^3/uL      

 

 Granulocyte %  85.1 %  High  38-83  

 

 Lymphocyte %  11.2 %  Low  25-47  

 

 Monocyte %  2.8 %    1-9  

 

 Eosinophil %  0.4 %    0-6  

 

 Basophil %  0.5 %    0-2  

 

 Nucleated Red Blood Cells %  0.1      









 Inr/Protime  2013  Inr  0.88    0.87-0.97  

 

 Laboratory test finding  2013  Lactic Acid  1.2 mmol/L    0.5-1.6  









 Lipase  20 U/L  Low  22-51  

 

 Troponin I  0.01 ng/mL    0-0.06  42

 

 C Reactive Protein  1.4 mg/dL  High  Less than 0.5  









 Comp Metabolic Panel  2013  Sodium  132 mmol/L  Low  133-145  









 Potassium  3.8 mmol/L    3.5-5.0  

 

 Chloride  100 mmol/L  Low  101-111  

 

 Co2 Carbon Dioxide  25.0 mmol/L    22-32  

 

 Anion Gap  7.0 mmol/L    2-11  

 

 Glucose  127 mg/dL  High    

 

 Blood Urea Nitrogen  12 mg/dL    6-24  

 

 Creatinine  0.70 mg/dL    0.50-1.40  

 

 BUN/Creatinine Ratio  17.1    8-20  

 

 Calcium  9.4 mg/dL    8.1-9.9  

 

 Total Protein  6.5 g/dL    6.2-8.1  

 

 Albumin  3.8 g/dL    3.2-5.2  

 

 Globulin  2.7 g/dL    2-4  

 

 Albumin/Globulin Ratio  1.4    1-3  

 

 Total Bilirubin  0.6 mg/dL    0.4-1.5  

 

 Alkaline Phosphatase  86 U/L      

 

 Alt  17 U/L    14-54  

 

 Ast  21 U/L    12-42  

 

 Egfr Non-  81.4    &gt;60  

 

 Egfr   104.6    &gt;60  43









 Laboratory test finding  2012  Hepatitis B Surface  Nonreactive    
Nonreactive  44



     Ag        

 

 Hepatitis B Surface AB  2012  Hepatitis B Surface  Nonreactive    
Nonreactive  44



     AB        









 Hbsab Index  &lt; 0.10      44, 45









 Laboratory test finding  2012  Hepatitis C Antibody  Nonreactive    
Nonreactive  44

 

 Comp Metabolic Panel  2012  Sodium  138 mmol/L    135-145  









 Potassium  3.9 mmol/L    3.5-5.0  

 

 Chloride  105 mmol/L    101-111  

 

 Co2 (Carbon Dioxide)  28.0 mmol/L    22-32  

 

 Anion Gap  5.0 mmol/L    2-11  46

 

 Glucose  95 mg/dL      

 

 BUN  17 mg/dL    6-24  

 

 Creatinine  0.8 mg/dL    0.50-1.40  

 

 One Over Creatinine  1.25      

 

 BUN/Creatinine Ratio  21.3  High  8-20  

 

 Calcium  9.7 mg/dL    8.1-9.9  

 

 Total Protein  7.0 GM/DL    6.2-8.1  

 

 Albumin  3.7 GM/DL    3.2-5.2  

 

 Globulin  3.3 GM/DL    2-4  

 

 Albumin/Globulin Ratio  1.1    1-3  

 

 Bilirubin Total  0.5 mg/dL    0.4-1.5  47

 

 Alkaline Phosphatase  77 U/L      

 

 Alt (SGPT)  14 U/L    14-54  

 

 Ast (Sgot)  19 U/L    12-42  

 

 eGFR Non-  69.9    &gt; 60  

 

 eGFR   89.9    &gt; 60  48









 CBC Auto Diff  2012  White Blood Count  8.5 CUMM    4.8-10.8  









 Red Cell Count  3.94 CUMM  Low  4.2-5.4  

 

 Hemoglobin  12.3 g/dL    12.0-16.0  

 

 Hematocrit  35 %    35-47  

 

 Mean Corpuscular Volume  88 um3    79-97  

 

 Mean Corpuscular Hemoglob  31 pg    27-31  

 

 Mean Corpuscular HGB Cone  35 g/dL    32-36  

 

 Redcell Distribution WDTH  14 %    10.5-15  

 

 Platelet Count  240 CUMM    150-450  

 

 Mean Platelet Volume  8.6 um3    7.4-10.4  

 

 Gran %  65.7 %    38-83  

 

 Lymph %  26.2 %    25-47  

 

 Mononuclear %  5.8 %    1-9  

 

 Eosinophil %  1.5 %    0-6  

 

 Basophil %  0.8 %    0-2  

 

 Abs Lymphs  2.2    1.0-4.8  

 

 Abs Mononuclear  0.5    0-0.8  

 

 Absolute Neutrophil Count  5.6    1.5-7.7  

 

 Abs Eosinophils  0.1    0-0.6  

 

 Abs Basophils  0.1    0-0.2  









 Laboratory test finding  2012  Troponin-I  0 NG/ML    0-0.06  49

 

 CKMB  2012  CKMB In NG/ML  2.0 NG/ML    0.3-4.0  









 % CKMB  0 %MB    0-9  50









 Laboratory test finding  2012  CK For CKMB  66 U/L    0-170  

 

 Laboratory test finding  2011  TSH  3.62 MIU/ML    0.34-5.60  

 

 Lipid Panel  2011  Triglyceride  213 mg/dL  High    









 Cholesterol  188 mg/dL    Less Than 200  51

 

 High Density Lipoprotein  37 mg/dL  Low  40-60  52

 

 Cholesterol/HDL Ratio  5.08 AVERAGE  High  1-4.44  

 

 Low Density Lipoprotein  108 mg/dL  High  Less Than 100  53









 CMP Panel  2011  Sodium  136 mmol/L    135-145  









 Potassium  4.1 mmol/L    3.5-5.0  

 

 Chloride  102 mmol/L    101-111  

 

 Co2 (Carbon Dioxide)  28.0 mmol/L    22-32  

 

 Anion Gap  6.0 mmol/L    2-11  54

 

 Glucose  82 mg/dL      

 

 BUN  16 mg/dL    6-24  

 

 Creatinine  0.8 mg/dL    0.50-1.40  

 

 One Over Creatinine  1.25      

 

 BUN/Creatinine Ratio  20.0    8-20  

 

 Calcium  9.9 mg/dL    8.1-9.9  

 

 Total Protein  6.9 GM/DL    6.2-8.1  

 

 Albumin  3.8 GM/DL    3.2-5.2  

 

 Globulin  3.1 GM/DL    2-4  

 

 Albumin/Globulin Ratio  1.2    1-3  

 

 Bilirubin Total  0.6 mg/dL    0.4-1.5  55

 

 Alkaline Phosphatase  97 U/L      

 

 Alt (SGPT)  18 U/L    14-54  

 

 Ast (Sgot)  21 U/L    12-42  

 

 eGFR Non-  70.1    &gt; 60  

 

 eGFR   90.2    &gt; 60  56









 Vitamin D, 25 Hydroxy  2011  25-Hydroxy Vitamin D2  &lt;4.0 ng/mL    ()  









 25-Hydroxy Vitamin D3  24 ng/mL    ()  

 

 25-Hydroxy Vitamin D Total  24 ng/mL    ()  57









 Vitamin D 1,25 And  2011  Vitamin D, 1,25  20 pg/mL    18-78  58



 Vitamin D,2    Dihydroxy        

 

 Laboratory test  2011  TSH  4.15 MIU/ML    0.34-5.60  



 finding            

 

 Lipid Profile  2011  Triglyceride  218 mg/dL  High    



 (Trig/Chol/HDL)            









 Cholesterol  177 mg/dL    Less Than 200  59

 

 High Density Lipoprotein  32 mg/dL  Low  40-60  60

 

 Cholesterol/HDL Ratio  5.53 AVERAGE  High  1-4.44  

 

 Low Density Lipoprotein  101 mg/dL  High  Less Than 100  61









 Comp Metabolic Panel  2011  Sodium  139 mmol/L    135-145  









 Potassium  4.1 mmol/L    3.5-5.0  

 

 Chloride  105 mmol/L    101-111  

 

 Co2 (Carbon Dioxide)  28.0 mmol/L    22-32  

 

 Anion Gap  6.0 mmol/L    2-11  62

 

 Glucose  89 mg/dL      

 

 BUN  13 mg/dL    6-24  

 

 Creatinine  0.9 mg/dL    0.50-1.40  

 

 One Over Creatinine  1.11      

 

 BUN/Creatinine Ratio  14.4    8-20  

 

 Calcium  9.3 mg/dL    8.1-9.9  

 

 Total Protein  5.9 GM/DL  Low  6.2-8.1  

 

 Albumin  3.5 GM/DL    3.2-5.2  

 

 Globulin  2.4 GM/DL    2-4  

 

 Albumin/Globulin Ratio  1.5    1-3  

 

 Bilirubin Total  0.4 mg/dL    0.4-1.5  63

 

 Alkaline Phosphatase  87 U/L      

 

 Alt (SGPT)  18 U/L    14-54  

 

 Ast (Sgot)  20 U/L    12-42  

 

 eGFR Non-  61.2    &gt; 60  

 

 eGFR   78.7    &gt; 60  64









 1  Desirable: &lt;150



   Borderline High: 150-199



   High: 200-499



   Very High: &gt;500

 

 2  Desirable: &lt;200



   Borderline High: 200-239



   High: &gt;239

 

 3  Low: &lt;40



   Desirable: 40-60



   High: &gt;60

 

 4  Desirable: &lt;100



   Near Optimal: 100-129



   Borderline High: 130-159



   High: 160-189



   Very High: &gt;189

 

 5  *******Because ethnic data is not always readily available,



   this report includes an eGFR for both -Americans and



   non- Americans.****



   The National Kidney Disease Education Program (NKDEP) does



   not endorse the use of the MDRD equation for patients that



   are not between the ages of 18 and 70, are pregnant, have



   extremes of body size, muscle mass, or nutritional status,



   or are non- or non-.



   According to the National Kidney Foundation, irrespective of



   diagnosis, the stage of the disease is based on the level of



   kidney function:



   Stage Description                      GFR(mL/min/1.73 m(2))



   1     Kidney damage with normal or decreased GFR       90



   2     Kidney damage with mild decrease in GFR          60-89



   3     Moderate decrease in GFR                         30-59



   4     Severe decrease in GFR                           15-29



   5     Kidney failure                       &lt;15 (or dialysis)

 

 6  [id number]

 

 7  SEE RESULT BELOW



   -----------------------------------------------------------------------------
---------------



   Name:  ZAYDA CRANE                : 1935    Attend Dr: 
Dasha Briones NP



   Acct:  [id number]  Unit: [id number]  AGE: 82            Location:  [facility]



   Re17                        SEX: F             Status:    REG REF



   -----------------------------------------------------------------------------
---------------



   



   SPEC: [id number]         CRISTA:   [address] [place] [address] DR: Dasha Briones NP



   REQ:  [id number]              RECD:   



   STATUS: COMP



   _



   SOURCE: URINE          SPDES:



   ORDERED:  Urine Culture



   COMMENTS: [id number]



   Urine Source: Random



   



   -----------------------------------------------------------------------------
---------------



   Procedure                         Result                         Reported   
        Site



   -----------------------------------------------------------------------------
---------------



   Urine Culture  Final                                             17-
1018      ML



   



   No growth of clinically significant organisms



   



   -----------------------------------------------------------------------------
---------------



   * ML - MAIN LAB ([facility])



   .



   



   



   



   



   



   



   



   



   



   



   



   



   



   



   



   



   



   



   



   



   



   



   



   ** END OF REPORT **



   



   * ML=Testing performed at Main Lab



   DEPARTMENT OF PATHOLOGY,  [address] [place] [address]



   Phone # [phone]      Fax #[phone]



   Hiro Bridges M.D. Director     [facility] # [id number]

 

 8  *Ascorbic acid is present which may interfere with detection



   of blood.

 

 9  Desirable &lt;150



   Borderline high 150-199



   High 200-499



   Very High &gt;500

 

 10  Desirable &lt;200



   Borderline high 200-239



   High &gt;239

 

 11  Low &lt;40



   Desirable: 40-60



   High: &gt;60

 

 12  Desirable: &lt;100 mg/dL



   Near Optimal: 100-129 mg/dL



   Borderline High: 130-159 mg/dL



   High: 160-189 mg/dL



   Very High: &gt;189 mg/dL

 

 13  *******Because ethnic data is not always readily available,



   this report includes an eGFR for both -Americans and



   non- Americans.****



   The National Kidney Disease Education Program (NKDEP) does



   not endorse the use of the MDRD equation for patients that



   are not between the ages of 18 and 70, are pregnant, have



   extremes of body size, muscle mass, or nutritional status,



   or are non- or non-.



   According to the National Kidney Foundation, irrespective of



   diagnosis, the stage of the disease is based on the level of



   kidney function:



   Stage Description                      GFR(mL/min/1.73 m(2))



   1     Kidney damage with normal or decreased GFR       90



   2     Kidney damage with mild decrease in GFR          60-89



   3     Moderate decrease in GFR                         30-59



   4     Severe decrease in GFR                           15-29



   5     Kidney failure                       &lt;15 (or dialysis)

 

 14  PT IS FASTING

 

 15  Desirable &lt;150



   Borderline high 150-199



   High 200-499



   Very High &gt;500

 

 16  Desirable &lt;200



   Borderline high 200-239



   High &gt;239

 

 17  Low &lt;40



   Desirable: 40-60



   High: &gt;60

 

 18  Desirable: &lt;100 mg/dL



   Near Optimal: 100-129 mg/dL



   Borderline High: 130-159 mg/dL



   High: 160-189 mg/dL



   Very High: &gt;189 mg/dL

 

 19  *******Because ethnic data is not always readily available,



   this report includes an eGFR for both -Americans and



   non- Americans.****



   The National Kidney Disease Education Program (NKDEP) does



   not endorse the use of the MDRD equation for patients that



   are not between the ages of 18 and 70, are pregnant, have



   extremes of body size, muscle mass, or nutritional status,



   or are non- or non-.



   According to the National Kidney Foundation, irrespective of



   diagnosis, the stage of the disease is based on the level of



   kidney function:



   Stage Description                      GFR(mL/min/1.73 m(2))



   1     Kidney damage with normal or decreased GFR       90



   2     Kidney damage with mild decrease in GFR          60-89



   3     Moderate decrease in GFR                         30-59



   4     Severe decrease in GFR                           15-29



   5     Kidney failure                       &lt;15 (or dialysis)

 

 20  *******Because ethnic data is not always readily available,



   this report includes an eGFR for both -Americans and



   non- Americans.****



   The National Kidney Disease Education Program (NKDEP) does



   not endorse the use of the MDRD equation for patients that



   are not between the ages of 18 and 70, are pregnant, have



   extremes of body size, muscle mass, or nutritional status,



   or are non- or non-.



   According to the National Kidney Foundation, irrespective of



   diagnosis, the stage of the disease is based on the level of



   kidney function:



   Stage Description                      GFR(mL/min/1.73 m(2))



   1     Kidney damage with normal or decreased GFR       90



   2     Kidney damage with mild decrease in GFR          60-89



   3     Moderate decrease in GFR                         30-59



   4     Severe decrease in GFR                           15-29



   5     Kidney failure                       &lt;15 (or dialysis)

 

 21  -------------------ADDITIONAL INFORMATION-------------------



   This is a qualitative test. The TB antigen IU/mL value is



   required for documentation on certain government reporting



   forms (e.g., Form I-693), but this value should not be used



   to monitor disease progression or response to therapy.



   Diagnosing or excluding tuberculosis disease, and assessing



   the probability of LTBI, require a combination of



   epidemiological, historical, medical, and diagnostic



   findings that should be taken into account when



   interpreting QuantiFERON-TB results.



   Test Performed by:



   [place] [address]



   : Jos Bales II, M.D., Ph.D.

 

 22  *******Because ethnic data is not always readily available,



   this report includes an eGFR for both -Americans and



   non- Americans.****



   The National Kidney Disease Education Program (NKDEP) does



   not endorse the use of the MDRD equation for patients that



   are not between the ages of 18 and 70, are pregnant, have



   extremes of body size, muscle mass, or nutritional status,



   or are non- or non-.



   According to the National Kidney Foundation, irrespective of



   diagnosis, the stage of the disease is based on the level of



   kidney function:



   Stage Description                      GFR(mL/min/1.73 m(2))



   1     Kidney damage with normal or decreased GFR       90



   2     Kidney damage with mild decrease in GFR          60-89



   3     Moderate decrease in GFR                         30-59



   4     Severe decrease in GFR                           15-29



   5     Kidney failure                       &lt;15 (or dialysis)

 

 23  Effective immediately, due to a laboratory mean normal



   Protime change, the reference range for the INR has changed.

 

 24  Reference Range and Interpretation:



   TnI (ng/mL)             Interpretation



   Less Than 0.03 ng/mL    Not supportive of diagnosis of MI



   0.03 - 0.50 ng/mL       Indeterminate: suggest serial



   studies if clinically indicated.



   Greater than 0.5 ng/mL  Consistent with diagnosis of MI

 

 25  SEE RESULT BELOW



   -----------------------------------------------------------------------------
---------------



   Name:  ZAYDA CRANE                : 1935    Attend Dr: Paola Alves MD



   Acct:  [id number]  Unit: [id number]  AGE: 80            Location:  



   Reg:   10/26/15                        SEX: F             Status:    REG REF



   -----------------------------------------------------------------------------
---------------



   



   SPEC: [id number]         CRISTA:   10/26/    [facility] DR: Paola Alves MD



   REQ:  [id number]              RECD:   10/26/



   STATUS: DEBORA MORAN DR: Dasha Briones NP



   _



   SOURCE: JOINT FLUI     SPDESC:HIP RIGHT



   ORDERED:  BF Cult/GS



   



   -----------------------------------------------------------------------------
---------------



   Procedure                         Result                         Verified   
        Site



   -----------------------------------------------------------------------------
---------------



   Body Fluid Gram Stain  Final                                     10/27/15-
0758      ML



   2+ Neutrophils



   



   No Organisms Seen



   



   Preparation                 By Cytospin Smear



   



   Body Fluid Culture  Final                                        10/29/15-
0838      ML



   No Growth Day 4



   



   -----------------------------------------------------------------------------
---------------



   * ML - MAIN LAB ([facility])



   .



   



   



   



   



   



   



   



   



   



   



   



   



   



   



   



   



   



   



   



   



   ** END OF REPORT **



   



   * ML=Testing performed at Main Lab



   DEPARTMENT OF PATHOLOGY,  [address] [place] [address]



   Phone # [phone]      Fax #[phone]



   Hiro Bridges M.D. Director     [facility] # [id number]

 

 28  Acute and chronic inflammation.  Recommend correlation with



   microbiology culture studies.  No evidence of malignancy.



   



   Reviewed by Steffanie Holley MD

 

 27  Acute inflammation:  &gt;10.00

 

 28  FASTING 10 HOUR

 

 29  Desirable &lt;150



   Borderline high 150-199



   High 200-499



   Very High &gt;500

 

 30  Desirable &lt;200



   Borderline high 200-239



   High &gt;239

 

 31  Low &lt;40



   Desirable: 40-60



   High: &gt;60

 

 32  Desirable: &lt;100 mg/dL



   Near Optimal: 100-129 mg/dL



   Borderline High: 130-159 mg/dL



   High: 160-189 mg/dL



   Very High: &gt;189 mg/dL

 

 33  *******Because ethnic data is not always readily available,



   this report includes an eGFR for both -Americans and



   non- Americans.****



   The National Kidney Disease Education Program (NKDEP) does



   not endorse the use of the MDRD equation for patients that



   are not between the ages of 18 and 70, are pregnant, have



   extremes of body size, muscle mass, or nutritional status,



   or are non- or non-.



   According to the National Kidney Foundation, irrespective of



   diagnosis, the stage of the disease is based on the level of



   kidney function:



   Stage Description                      GFR(mL/min/1.73 m(2))



   1     Kidney damage with normal or decreased GFR       90



   2     Kidney damage with mild decrease in GFR          60-89



   3     Moderate decrease in GFR                         30-59



   4     Severe decrease in GFR                           15-29



   5     Kidney failure                       &lt;15 (or dialysis)

 

 34  *******Because ethnic data is not always readily available,



   this report includes an eGFR for both -Americans and



   non- Americans.****



   The National Kidney Disease Education Program (NKDEP) does



   not endorse the use of the MDRD equation for patients that



   are not between the ages of 18 and 70, are pregnant, have



   extremes of body size, muscle mass, or nutritional status,



   or are non- or non-.



   According to the National Kidney Foundation, irrespective of



   diagnosis, the stage of the disease is based on the level of



   kidney function:



   Stage Description                      GFR(mL/min/1.73 m(2))



   1     Kidney damage with normal or decreased GFR       90



   2     Kidney damage with mild decrease in GFR          60-89



   3     Moderate decrease in GFR                         30-59



   4     Severe decrease in GFR                           15-29



   5     Kidney failure                       &lt;15 (or dialysis)

 

 35  HDL Interpretation:



   Undesirable: High Risk:  Less than 40 mg/dL



   Desirable:  Low Risk:  Greater than 60 mg/dL

 

 36  LDL Interpretation:



   Low Risk Optimal Level:  LDL Less than 100 mg/dL



   Near or Above Optimal:  -129 mg/dL



   Borderline High Risk:  -159 mg/dL



   High Risk:  -189 mg/dL



   Very High Risk:  LDL Greater than 189 mg/dL

 

 37  *******Because ethnic data is not always readily available,



   this report includes an eGFR for both -Americans and



   non- Americans.****



   The National Kidney Disease Education Program (NKDEP) does



   not endorse the use of the MDRD equation for patients that



   are not between the ages of 18 and 70, are pregnant, have



   extremes of body size, muscle mass, or nutritional status,



   or are non- or non-.



   According to the National Kidney Foundation, irrespective of



   diagnosis, the stage of the disease is based on the level of



   kidney function:



   Stage Description                      GFR(mL/min/1.73 m(2))



   1     Kidney damage with normal or decreased GFR       90



   2     Kidney damage with mild decrease in GFR          60-89



   3     Moderate decrease in GFR                         30-59



   4     Severe decrease in GFR                           15-29



   5     Kidney failure                       &lt;15 (or dialysis)

 

 38  HDL Interpretation:



   Undesirable: High Risk:  Less than 40 mg/dL



   Desirable:  Low Risk:  Greater than 60 mg/dL

 

 39  LDL Interpretation:



   Low Risk Optimal Level:  LDL Less than 100 mg/dL



   Near or Above Optimal:  -129 mg/dL



   Borderline High Risk:  -159 mg/dL



   High Risk:  -189 mg/dL



   Very High Risk:  LDL Greater than 189 mg/dL

 

 40  RUN DATE: 13               [facility] **LIVE**       
         PAGE    1



   RUN TIME: 20             [address] [place], [place]   [address]



   Specimen Inquiry



   



   -----------------------------------------------------------------------------
---------------



   Name:  ZAYDA CRANE                : 1935    Attend Dr: Randy Lala MD



   Acct:  [id number]  Unit: [id number]  AGE: 78            Location:  ED



   Re13                        SEX: F             Status:    DEP ER



   -----------------------------------------------------------------------------
---------------



   



   SPEC: [id number]         CRISTA:       JUAN MANUEL DR: Dunia MCFARLANE



   REQ:  [id number]              RECD:   



   STATUS: COMP             OTHR DR: [facility]



   Glenis Kent MD



   _



   SOURCE: BLOOD,VENO     SPDESC:



   ORDERED:  Blood Cult



   COMMENTS: Patient is On Antibiotics? NO



   



   -----------------------------------------------------------------------------
---------------



   Procedure                         Result                         Verified   
        Site



   -----------------------------------------------------------------------------
---------------



   Aerobic Culture Bottle  Final                                    13-
0020      ML



   No Growth Day 5



   



   Anaerobic Culture Bottle  Final                                  13-
0020      ML



   No Growth Day 5



   



   -----------------------------------------------------------------------------
---------------



   



   



   



   



   



   



   



   



   



   



   



   



   



   



   



   



   



   



   



   



   



   



   



   



   ** END OF REPORT **



   



   * ML=Testing performed at Main Lab



   DEPARTMENT OF PATHOLOGY,  [address] [place] [address]



   Phone # [phone]      Fax #[phone]



   Hiro Bridges M.D. Director     [place] Permit  #[id number]

 

 41  RUN DATE: 13               [facility] **LIVE**       
         PAGE    1



   RUN TIME: 26             [address] [place], [place]   [address]



   Specimen Inquiry



   



   -----------------------------------------------------------------------------
---------------



   Name:  ZAYDA CRANE                : 1935    Attend Dr: Randy Lala MD



   Acct:  [id number]  Unit: [id number]  AGE: 78            Location:  ED



   Re13                        SEX: F             Status:    DEP ER



   -----------------------------------------------------------------------------
---------------



   



   SPEC: [id number]         CRISTA:       JUAN MANUEL DR: Dunia MCFARLANE



   REQ:  [id number]              RECD:   



   STATUS: COMP             OTHR DR: [facility]



   Glenis Kent MD



   _



   SOURCE: BLOOD,VENO     SPDESC:



   ORDERED:  Blood Cult



   



   -----------------------------------------------------------------------------
---------------



   Procedure                         Result                         Verified   
        Site



   -----------------------------------------------------------------------------
---------------



   Aerobic Culture Bottle  Final                                    13-
0026      ML



   No Growth Day 5



   



   Anaerobic Culture Bottle  Final                                  13-
0026      ML



   No Growth Day 5



   



   -----------------------------------------------------------------------------
---------------



   



   



   



   



   



   



   



   



   



   



   



   



   



   



   



   



   



   



   



   



   



   



   



   



   



   ** END OF REPORT **



   



   * ML=Testing performed at Main Lab



   DEPARTMENT OF PATHOLOGY,  [address] [place] [address]



   Phone # [phone]      Fax #[phone]



   Hiro Bridges M.D. Director     [place] Permit  #[id number]

 

 42  Reference Range and Interpretation:



   TnI (ng/mL)             Interpretation



   Less Than 0.06 ng/mL    Not supportive of diagnosis of MI



   0.06 - 0.50 ng/mL       Indeterminate: suggest serial



   studies if clinically indicated.



   Greater than 0.5 ng/mL  Consistent with diagnosis of MI

 

 43  *******Because ethnic data is not always readily available,



   this report includes an eGFR for both -Americans and



   non- Americans.****



   The National Kidney Disease Education Program (NKDEP) does



   not endorse the use of the MDRD equation for patients that



   are not between the ages of 18 and 70, are pregnant, have



   extremes of body size, muscle mass, or nutritional status,



   or are non- or non-.



   According to the National Kidney Foundation, irrespective of



   diagnosis, the stage of the disease is based on the level of



   kidney function:



   Stage Description                      GFR(mL/min/1.73 m(2))



   1     Kidney damage with normal or decreased GFR       90



   2     Kidney damage with mild decrease in GFR          60-89



   3     Moderate decrease in GFR                         30-59



   4     Severe decrease in GFR                           15-29



   5     Kidney failure                       &lt;15 (or dialysis)

 

 44  FAX RESULTS TO DR ROBERT AT FAX NUMBER [phone]

 

 45  The World Health Organization (WHO) Hepatitis B



   Immunoglobulin 1st International Reference Preparation



   ():



   The accepted criteria for immunity to HBV is anti-HBs



   activity greater than or equal to 10 mIU/mL.



   An Index Value of 1.00 is equivalent to 10 mIU/mL. Samples



   with an Index Value of 1.00 or greater are considered



   reactive (protective) in accordance with the CDC guidelines.

 

 46  Anion gap measurement may be of limited value in the



   presence of any alkalosis, especially in a combined acid



   base disorder.



   .

 

 47  A metabolite of Naproxen, O-desmethylnaproxen, has been



   shown to interfere with the Jendrassik-Gaurav method for



   measuring total bilirubin.  Samples from patients who have



   taken Naproxen have shown spurious elevation in total



   bilirubin levels.

 

 48  *******Because ethnic data is not always readily available,



   this report includes an eGFR for both -Americans and



   non- Americans.****



   The National Kidney Disease Education Program (NKDEP) does



   not endorse the use of the MDRD equation for patients that



   are not between the ages of 18 and 70, are pregnant, have



   extremes of body size, muscle mass, or nutritional status,



   or are non- or non-.



   According to the National Kidney Foundation, irrespective of



   diagnosis, the stage of the disease is based on the level of



   kidney function:



   Stage Description                      GFR(mL/min/1.73 m(2))



   1     Kidney damage with normal or decreased GFR       90



   2     Kidney damage with mild decrease in GFR          60-89



   3     Moderate decrease in GFR                         30-59



   4     Severe decrease in GFR                           15-29



   5     Kidney failure                       &lt;15 (or dialysis)

 

 49  New Reference Range and Interpretation effective 10/04/2002



   



   TnI (ng/ml)             INTERPRETATION



   



   Less Than 0.06 ng/mL    NOT SUPPORTIVE OF DIAGNOSIS OF MI



   



   0.06 - 0.50 ng/ml       INDETERMINATE: SUGGEST SERIAL



   STUDIES IF CLINICALLY INDICATED.



   



   Greater than 0.5 ng/mL  CONSISTENT WITH DIAGNOSIS OF MI



   .

 

 50  INTERPRETATION



   



   %CK-MB



   



   &lt; 5%       NOT SUPPORTIVE OF DIAGNOSIS OF MI



   



   5 - &lt;10%   INDETERMINATE; SUGGEST SERIAL



   STUDIES IF CLINICALLY INDICATED



   



   10% OR &gt;   CONSISTENT WITH DIAGNOSIS OF MI



   



   .

 

 51  CHOLESTEROL INTERPRETATION:



   Desirable:  Less than 200 MG/DL



   Borderline-High Risk:  200-239 MG/DL



   High-Risk:  240 MG/DL and over

 

 52  HDL INTERPRETATION:



   Undesirable: High Risk:  Less than 40 MG/DL



   Desirable:  Low Risk:  Greater than 60 MG/DL

 

 53  LDL INTERPRETATION:



   Low Risk Optimal Level:  LDL Less than 100 MG/DL



   Near or Above Optimal:  -129 MG/DL



   Borderline High Risk:  -159 MG/DL



   High Risk:  -189 MG/DL



   Very High Risk:  LDL Greater than 189 MG/DL

 

 54  Anion gap measurement may be of limited value in the



   presence of any alkalosis, especially in a combined acid



   base disorder.



   .

 

 55  A metabolite of Naproxen, O-desmethylnaproxen, has been



   shown to interfere with the Jendrassik-[place] method for



   measuring total bilirubin.  Samples from patients who have



   taken Naproxen have shown spurious elevation in total



   bilirubin levels.

 

 56  *******Because ethnic data is not always readily available,



   this report includes an eGFR for both -Americans and



   non- Americans.****



   The National Kidney Disease Education Program (NKDEP) does



   not endorse the use of the MDRD equation for patients that



   are not between the ages of 18 and 70, are pregnant, have



   extremes of body size, muscle mass, or nutritional status,



   or are non- or non-.



   According to the National Kidney Foundation, irrespective of



   diagnosis, the stage of the disease is based on the level of



   kidney function:



   Stage Description                      GFR(mL/min/1.73 m(2))



   1     Kidney damage with normal or decreased GFR       90



   2     Kidney damage with mild decrease in GFR          60-89



   3     Moderate decrease in GFR                         30-59



   4     Severe decrease in GFR                           15-29



   5     Kidney failure                       &lt;15 (or dialysis)

 

 57  Interpretation: 10-24 (mild to moderate deficiency)



   



   -- REFERENCE VALUE --



   25-HYDROXY D TOTAL (D2+D3)



   Optimum levels in the normal population are 25-80



   



   Test Performed by:



   [facility]



   [address] [place] [address]



   : Joe Paris III, M.D.

 

 58  Test Performed by:



   [facility]



   [address] [place] [address]



   : Joe Paris III, M.D.

 

 59  CHOLESTEROL INTERPRETATION:



   Desirable:  Less than 200 MG/DL



   Borderline-High Risk:  200-239 MG/DL



   High-Risk:  240 MG/DL and over

 

 60  HDL INTERPRETATION:



   Undesirable: High Risk:  Less than 40 MG/DL



   Desirable:  Low Risk:  Greater than 60 MG/DL

 

 61  LDL INTERPRETATION:



   Low Risk Optimal Level:  LDL Less than 100 MG/DL



   Near or Above Optimal:  -129 MG/DL



   Borderline High Risk:  -159 MG/DL



   High Risk:  -189 MG/DL



   Very High Risk:  LDL Greater than 189 MG/DL

 

 62  Anion gap measurement may be of limited value in the



   presence of any alkalosis, especially in a combined acid



   base disorder.



   .

 

 63  A metabolite of Naproxen, O-desmethylnaproxen, has been



   shown to interfere with the Jendrassik-Gaurav method for



   measuring total bilirubin.  Samples from patients who have



   taken Naproxen have shown spurious elevation in total



   bilirubin levels.

 

 64  *******Because ethnic data is not always readily available,



   this report includes an eGFR for both -Americans and



   non- Americans.****



   The National Kidney Disease Education Program (NKDEP) does



   not endorse the use of the MDRD equation for patients that



   are not between the ages of 18 and 70, are pregnant, have



   extremes of body size, muscle mass, or nutritional status,



   or are non- or non-.



   According to the National Kidney Foundation, irrespective of



   diagnosis, the stage of the disease is based on the level of



   kidney function:



   Stage Description                      GFR(mL/min/1.73 m(2))



   1     Kidney damage with normal or decreased GFR       90



   2     Kidney damage with mild decrease in GFR          60-89



   3     Moderate decrease in GFR                         30-59



   4     Severe decrease in GFR                           15-29



   5     Kidney failure                       &lt;15 (or dialysis)







Procedures







 Date  CPT Code  Description  Status  Comment

 

 10/28/2016    Mammogram  Completed  

 

 12/15/2015    Bone Mineral Density Test  Completed  

 

 11/15/2015  67545  THR Total Hip Replacement  Completed  

 

 11/15/2015  13515  THR Total Hip Replacement  Completed  

 

 2015    Mammogram  Completed  

 

 06/15/2015  14498  Inject/Drain Joint/Bursa  Completed  



     Major    

 

 2015    Diabetic Retinal Eye Exam  Completed  Document: 05/20/15 -



         Consult Ophthalmology -



         Adriana

 

 201510  Inject/Drain Joint/Bursa  Completed  



     Major    

 

 2014    Mammogram  Completed  

 

 2014    Bone Mineral Density Test  Completed  

 

 2012    Diabetic Retinal Eye Exam  Completed  

 

 2012  62170  EKG Tracing &amp;  Completed  



     Interpretation    

 

 2011    Mammogram  Completed  

 

 2011    Bone Mineral Density Test  Completed  

 

 2011  96260  EKG Tracing &amp;  Completed  



     Interpretation    

 

 2010    Mammogram  Completed  

 

 2009    Bone Mineral Density Test  Completed  

 

 2009  40511  EKG Tracing &amp;  Completed  



     Interpretation    

 

 2008  24697  EKG Tracing &amp;  Completed  



     Interpretation    

 

 2008  17519  EKG Tracing &amp;  Completed  



     Interpretation    

 

 08/10/2007  97569  EKG Tracing &amp;  Completed  



     Interpretation    

 

 2007  32154  EKG Tracing &amp;  Completed  



     Interpretation    

 

 2007    Colonoscopy  Completed  

 

 2007  95292  EKG, Interpretation Only  Completed  

 

 2007  93613  EKG, Interpretation Only  Completed  







Encounters







 Type  Date  Location  Provider  CPT E/M  Dx

 

 Office Visit  2017  1:20p  [facility]  Dasha Briones N.P.  
79436  R32



     - Thaxton      

 

 Office Visit  2017  2:20p  [facility]  Dasha Briones N.P.  
78091  I10



     - Thaxton      









 R41.81









 Office Visit  2017  2:00p  [facility]  Dasha Briones N.P.  
47364  R41.81



     - Thaxton      









 R45.1









 Office Visit  2016  1:45p  [facility]  Paola Alves M.D.  
63084  Z47.1



     C.M.A.      









 Z96.641

 

 M25.551









 Office Visit  2016  1:20p  [facility]  Dasha Briones N.P.  
28669  R41.81



     - Thaxton      

 

 Office Visit  08/15/2016  1:20p  [facility]  Dasha Briones N.P.  
88933  R41.81



     - Thaxton      

 

 Office Visit  2016  3:00p  [facility]  Dasha Briones N.P.  
38529  E78.0



     - Thaxton      









 I10

 

 E55.9









 Office Visit  2016  3:00p  [facility]  Paola Alves M.D.  
30376  M25.561



     C.M.A.      









 M17.11









 Office Visit  2015  2:00p  [facility]  Dasha Briones N.P.  
75256  M85.89



     - Thaxton      









 M84.451A









 Office Visit  2015 10:13a  [facility],  89622  
S72.011A



     [facility]  M.D.    









 D64.9

 

 I10

 

 F03.90









 Office Visit  2015 10:12a  [facility],  42538  
S72.011A



     [facility]  M.D.    









 D64.9

 

 I10

 

 F03.90









 Office Visit  2015 10:11a  [facility],  75605  
S72.011A



     [facility]  M.D.    









 D64.9

 

 I10

 

 F03.90









 Office Visit  2015 10:11a  [facility],  31830  
S72.011A



     [facility]  M.D.    









 I10

 

 F03.90

 

 D64.9









 Office Visit  11/15/2015  7:00a  [facility]  Paola Alves,  26888  
M84.451A



     C.M.A.  M.D.    









 M16.11









 Office Visit  11/15/2015 10:10a  [facility],  Jackie Boles,  
06113  K21.9



     Hospitalists  M.D.    









 S72.011A

 

 I10

 

 F03.90









 Office Visit  2015 10:09a  [facility],  Jackie Boles,  
48417  K21.9



     Hospitalists  M.DBipin    









 S72.011A

 

 I10

 

 F03.90









 Office Visit  2015  2:00p  [facility]  Paola Alves M.D.  
99136  M25.551



     C.M.A.      









 M16.0









 Office Visit  10/23/2015  8:30a  [facility]  Paola Alves M.D.  
28365  M16.0



     C.M.A.      









 M25.551

 

 M16.11









 Office Visit  06/15/2015  3:45p  [facility]  Calin Gaffney M.D.  
95453  726.10



     C.M.A.      

 

 Office Visit  2015 11:15a  [facility]  Irina Navarrete,  
88467  726.10



     C.M.A.  RPA-C    

 

 Office Visit  04/15/2015 10:30a  [facility]  Calin Gaffney M.D.  
36105  726.10



     C.M.ABipin      

 

 Office Visit  2015  3:00p  [facility]  Dasha Briones, N.P.  
69029  401.1



     - Thaxton      

 

 Office Visit  2014  2:20p  [facility]  Dasha Briones N.P.  
23070  724.5



     - Thaxton      









 780.93









 Office Visit  2014  1:20p  [facility]  Dasha Briones, N.P.  
63317  V70.0



     - Thaxton      









 V72.31

 

 V76.10

 

 272.4

 

 401.1

 

 530.81

 

 733.90

 

 780.93

 

 724.3









 Office Visit  2014 11:40a  [facility] -  Glenis eKnt M.D.,  
59203  272.0



     Thaxton  FACP    









 627.9









 Office Visit  10/07/2013 11:40a  [facility] -  Glenis Kent M.D.,  
02222  272.4



     Thaxton  FACP    









 724.3









 Office Visit  2012 10:20a  [facility]  Glenis Kent M.D.,  
16972  724.3



     - Thaxton  FACP    

 

 Office Visit  2012 11:40a  [facility]  Glenis Kent M.D.,  
46839  786.59



     - Thaxton  FACP    

 

 Office Visit  2012  2:40p  [facility]  Glenis Kent M.D.,  
92892  786.59



     - Thaxton  FACP    

 

 Office Visit  2011  1:45p  DO Not Use  Glenis Kent M.D.,  37311  401.1



     [facility]  FACP    









 780.93

 

 530.81

 

 627.9









 Office Visit  2010  3:00p  DO Not Use [facility]  Glenis Kent  62435
  715.94



       MJUAREZ, FACP    

 

 Office Visit  2009  2:15p  DO Not Use [facility]  Glenis Kent  20421
  272.4



       M.DBipin, FACP    









 733.90

 

 309.0









 Office Visit  2009  2:15p  DO Not Use [facility]  Glenis Yoli,  46988
  V70.0



       M.D., FACP    









 724.3

 

 733.90

 

 788.41

 

 401.1

 

 V04.81









 Office Visit  2009  1:45p  DO Not Use [facility]  Dasha Padronn,  
23446  784.0



       N.P.    









 386.11









 Office Visit  2009 11:45a  DO Not Use [facility]  Glenis Yoli,  98464
  466.0



       M.D., FACP    

 

 Office Visit  10/23/2008  2:00p  DO Not Use [facility]  Glenis Yoli,  45799
  724.3



       M.D., FACP    









 V04.81









 Office Visit  2008  9:45a  DO Not Use [facility]  Glenis Yoli,  37136
  724.3



       M.D., FACP    

 

 Office Visit  2008 10:00a  DO Not Use [facility]  Glenis Yoli,  47864
  724.3



       M.D., FACP    

 

 Office Visit  2008  2:00p  [facility]  Amor Samuel,  
99757  724.3



     [facility]  M.D.    









 722.10









 Office Visit  2008 12:00p  DO Not Use [facility]  Glenis Yoli,  72035
  722.10



       M.D., FACP    

 

 Office Visit  2008 12:00p  DO Not Use [facility]  Glenis Yoli,  95213
  722.10



       M.D., FACP    









 724.3









 Office Visit  2008  4:00p  DO Not Use [facility]  Dasha Briones,  
47538  720.2



       N.P.    









 724.3

 

 724.2









 Office Visit  2008  3:30p  DO Not Use [facility]  Glenis Yoli,  62580
  724.3



       M.D., FACP    

 

 Office Visit  2008  4:15p  DO Not Use [facility]  Glenis Yoli,  53848
  724.3



       M.D., FACP    

 

 Office Visit  2008 10:15a  DO Not Use [facility]  Glenis Yoli,  63516
  V70.0



       M.D., FACP    









 401.1

 

 V06.5









 Office Visit  2008 12:15p  DO Not Use [facility]  Glenis Yoli,  71257
  724.3



       M.D., FACP    

 

 Office Visit  2008 12:15p  DO Not Use [facility]  Glenis Yoli,  82206
  724.3



       M.D., FACP    









 272.4

 

 401.1









 Office Visit  2007 12:00p  DO Not Use [facility]  Glenis Yoli,  46772
  786.50



       M.D., FACP    

 

 Office Visit  08/10/2007 12:15p  DO Not Use [facility]  Glenis Yoli,  93426
  786.50



       M.D., FACP    

 

 Office Visit  2007  2:15p  DO Not Use [facility]  Glenis Yoli,  63360
  272.0



       M.D., FACP    









 401.1

 

 729.5

 

 V05.8









 Office Visit  2007  1:30p  DO Not Use [facility]  Glenis Yoli,  52367
  401.1



       M.D., FACP    









 272.4

 

 733.90

 

 V70.0









 Office Visit  2006 10:00a  DO Not Use [facility]  [place],  [id number]
  386.11



       M.D., FACP    









 401.1









 Office Visit  2006  2:45p  DO Not Use [facility]  [place],  [id number]
  386.11



       M.D., FACP    







Plan of Care

Future Appointment(s):10/15/2018 10:00 am - Dasha Briones, N.P. at [facility] Internal 
Medicine - Mvdylvgvs14/13/2018 - Dasha Briones, N.P.I10 Essential (primary) 
hypertensionComments:For your high blood pressure:  Continue with your current 
medication.K21.9 Gastro-esophageal reflux disease without esophagitisComments:
For the nausea and upset stomach I want you to start taking the Omeprazole 
every day. For the nauseayou can try increasing the Ondansetron to 8 mg. I 
advise you to avoid food triggers. These include: Spicy, greasy, and acidic 
foods, along with coffee and alcohol.R41.81 Age-related cognitive declineNew 
Medication:Donepezil HCL 23 mgComments:I am referring you to a neurologist for 
further evaluation.Referral:Ji Stuart M.D., Neurology

## 2018-05-03 NOTE — ED
Gunner GODINEZ Jennifer, scribed for Jonah Barrera MD on 05/03/18 at 0800 .





Abdominal Pain/Female





- HPI Summary


HPI Summary: 





The patient is an 83 year old female who was brought to the ED for abdominal 

pain that began last night. The patient had no recollection of complaining 

about abdominal pain in the ED. She did not know why she is in the ED, doesnt 

know the month, and doesnt remember the president. The patient denies chest 

pain, fevers, nausea, shortness of breath, and issues with urination/bowel 

movement. The patient showed discomfort when her legs were palpated. 





LEVEL 5 CAVEAT: HPI LIMITED DUE TO PT AMNESIA.





- History of Current Complaint


Stated Complaint: ABD PAIN


Time Seen by Provider: 05/03/18 07:53


Hx Obtained From: Patient, EMS


Onset/Duration: Lasting Days - last night, Still Present


Severity Currently: Moderate


Location: Discrete At: LLQ


Associated Signs and Symptoms: Positive: Negative - chest pain, fever, nausea, 

shortness of breath, issues with urination/bowel movement, Other: - lower 

extremity pain


Allergies/Adverse Reactions: 


 Allergies











Allergy/AdvReac Type Severity Reaction Status Date / Time


 


ether Allergy Mild Unknown Verified 05/03/18 08:45





   Reaction  





   Details  











Home Medications: 


 Home Medications





Alendronate (NF) [Fosamax (NF)] 70 mg PO .TUESDAYS 05/03/18 [History Confirmed 

05/03/18]


Aspirin EC TAB* [Ecotrin EC Low Dose 81 MG*] 81 mg PO DAILY 05/03/18 [History 

Confirmed 05/03/18]


Calcium Carbonate/Vitamin D3 [Calcium 600 + Vit D Tablet] 1 tab PO DAILY 05/03/ 18 [History Confirmed 05/03/18]


Cholecalciferol TAB* [Vitamin D TAB*] 2,000 units PO EVERY OTHER DAY 05/03/18 [

History Confirmed 05/03/18]


Donepezil TAB* [Aricept 5 MG TAB*] 23 mg PO DAILY 05/03/18 [History Confirmed 05 /03/18]











PMH/Surg Hx/FS Hx/Imm Hx


Cardiovascular History: Reports: Hx Angina, Hx Hypercholesterolemia, Hx 

Hypertension


GI History: Reports: Hx Gastroesophageal Reflux Disease - TAKES PRILOSEC


Musculoskeletal History: 


   Comment Only: Hx Osteoporosis - PT DOESNT KNOW


Sensory History: Reports: Hx Contacts or Glasses - GLASSES


Opthamlomology History: Reports: Hx Contacts or Glasses - GLASSES


Neurological History: Reports: Hx Dementia





- Cancer History


Hx Chemotherapy: No


Hx Radiation Therapy: No





- Surgical History


Surgery Procedure, Year, and Place: RIGHT HIP REPLACEMENT


Hx Anesthesia Reactions: No


Infectious Disease History: 


   Denies: Hx Clostridium Difficile, Hx Hepatitis, Hx Human Immunodeficiency 

Virus (HIV), Hx of Known/Suspected MRSA, Hx Shingles, Hx Tuberculosis, Hx Known/

Suspected VRE, Hx Known/Suspected VRSA, History Other Infectious Disease





- Social History


Alcohol Use: unable to determine


Substance Use Type: Reports: None


Smoking Status (MU): Former Smoker


Type: Cigarettes


Length of Time of Smoking/Using Tobacco: 10 years


Have You Smoked in the Last Year: No





- Additional Comments


History Additional Comments: 





LEVEL 5 CAVEAT: PMH LIMITED DUE TO PT AMNESIA.





Review of Systems


Negative: Fever


Negative: Chest Pain


Negative: Shortness Of Breath


Positive: Abdominal Pain.  Negative: Nausea


Negative: dysuria


All Other Systems Reviewed And Are Negative: No





- Comments


Additional Review of Systems Comments: 





LEVEL 5 CAVEAT: ROS LIMITED DUE TO PT AMNESIA.





Physical Exam





- Summary


Physical Exam Summary: 





Appearance: Well appearing, no pain distress


Skin: warm, dry, reflects adequate perfusion


Head/face: normal


Eyes: EOMI, LOLA


ENT: moist mucous membranes, normal


Neck: supple, non-tender


Respiratory: CTA, breath sounds present


Cardiovascular: RRR, pulses symmetrical 


Abdomen: LLQ pain, no mass, big surgical scar across lower abdomen, mostly RLQ 

and suprapubic area, soft


Bowel Sounds: present


Musculoskeletal: normal, strength/ROM intact


Neuro: normal, sensory motor intact, A&O TO PERSON ONLY





LEVEL 5 CAVEAT: PHYSICAL EXAM LIMITED DUE TO PT AMNESIA.


Triage Information Reviewed: Yes


Vital Signs On Initial Exam: 


 Initial Vitals











Pulse Pulse Ox


 


 59   96 


 


 05/03/18 07:55  05/03/18 07:55











Vital Signs Reviewed: Yes





Diagnostics





- Vital Signs


 Vital Signs











  Temp Pulse Resp BP Pulse Ox


 


 05/03/18 10:00  97.0 F  62  18   98


 


 05/03/18 09:02   53    99


 


 05/03/18 07:58  98.0 F  56  20  121/44  99


 


 05/03/18 07:57     121/44 


 


 05/03/18 07:55   59    96














- Laboratory


Lab Results: 


 Lab Results











  05/03/18 05/03/18 05/03/18 Range/Units





  08:05 08:05 08:05 


 


WBC  11.1 H    (3.5-10.8)  10^3/ul


 


RBC  4.36    (4.0-5.4)  10^6/ul


 


Hgb  12.6    (12.0-16.0)  g/dl


 


Hct  37    (35-47)  %


 


MCV  85    (80-97)  fL


 


MCH  29    (27-31)  pg


 


MCHC  34    (31-36)  g/dl


 


RDW  14    (10.5-15)  %


 


Plt Count  224    (150-450)  10^3/ul


 


MPV  7.5    (7.4-10.4)  um3


 


Neut % (Auto)  79.0    (38-83)  %


 


Lymph % (Auto)  14.1 L    (25-47)  %


 


Mono % (Auto)  5.1    (0-7)  %


 


Eos % (Auto)  1.0    (0-6)  %


 


Baso % (Auto)  0.8    (0-2)  %


 


Absolute Neuts (auto)  8.8 H    (1.5-7.7)  10^3/ul


 


Absolute Lymphs (auto)  1.6    (1.0-4.8)  10^3/ul


 


Absolute Monos (auto)  0.6    (0-0.8)  10^3/ul


 


Absolute Eos (auto)  0.1    (0-0.6)  10^3/ul


 


Absolute Basos (auto)  0.1    (0-0.2)  10^3/ul


 


Absolute Nucleated RBC  0    10^3/ul


 


Nucleated RBC %  0    


 


Sodium   140   (139-145)  mmol/L


 


Potassium   3.8   (3.5-5.0)  mmol/L


 


Chloride   108   (101-111)  mmol/L


 


Carbon Dioxide   27   (22-32)  mmol/L


 


Anion Gap   5   (2-11)  mmol/L


 


BUN   13   (6-24)  mg/dL


 


Creatinine   0.83   (0.51-0.95)  mg/dL


 


Est GFR ( Amer)   84.9   (>60)  


 


Est GFR (Non-Af Amer)   66.0   (>60)  


 


BUN/Creatinine Ratio   15.7   (8-20)  


 


Glucose   97   ()  mg/dL


 


Lactic Acid    0.6  (0.5-2.0)  mmol/L


 


Calcium   9.7   (8.6-10.3)  mg/dL


 


Total Bilirubin   0.60   (0.2-1.0)  mg/dL


 


AST   13   (13-39)  U/L


 


ALT   9   (7-52)  U/L


 


Alkaline Phosphatase   81   ()  U/L


 


C-Reactive Protein   21.68 H   (< 5.00)  mg/L


 


Total Protein   6.6   (6.4-8.9)  g/dL


 


Albumin   3.8   (3.2-5.2)  g/dL


 


Globulin   2.8   (2-4)  g/dL


 


Albumin/Globulin Ratio   1.4   (1-3)  


 


Lipase   20   (11.0-82.0)  U/L











Result Diagrams: 


 05/03/18 08:05





 05/03/18 08:05


Lab Statement: Any lab studies that have been ordered have been reviewed, and 

results considered in the medical decision making process.





- CT


  ** CT Abd/Pel


CT Interpretation: Positive (See Comments) - MILDLY LIMITED STUDY FOR THE 

REASONS OUTLINED ABOVE. CT FINDINGS CONSISTENT WITH ACUTE DIVERTICULITIS OF THE 

SIGMOID COLON. NO OBSTRUCTION OR PERFORATION. Dr. Barrera has reviewed this 

report.


CT Interpretation Completed By: Radiologist





Re-Evaluation





- Re-Evaluation


  ** First Eval


Change: Improved





Abdominal Pain Fem Course/Dx





- Course


Course Of Treatment: Showed advanced dementia is currently unaware of why she 

was sent in.  She has no subjective abdominal tenderness at present.  Only with 

deep patient's left lower quadrant does she have any discomfort.  CT is 

positive for diverticulitis.  No fever, vomiting.  Start oral antibiotics and 

discharged back to nursing home.  Return with fever, increased pain or vomiting.





- Diagnoses


Differential Diagnosis: Positive: Bowel Obstruction, Constipation, 

Diverticulitis, Urinary Tract Infection


Provider Diagnoses: 


 Diverticulitis








Discharge





- Sign-Out/Discharge


Documenting (check all that apply): Discharge/Admit/Transfer





- Discharge Plan


Condition: Good


Disposition: SKILLED NURSING FACILITY


Prescriptions: 


Ciprofloxacin TAB* [Cipro 500 MG TAB*] 500 mg PO BID #14 tab


Lactobacillus Acidophilus* 1 cap PO BID #20 cap


metroNIDAZOLE [Flagyl 500 MG TAB] 500 mg PO TID #21 tab


Patient Education Materials:  Diverticulitis (ED)


Referrals: 


Reanna Montes MD [Primary Care Provider] - 


Additional Instructions: 


Return with fever, increased pain, worse or other concerns. Use tylenol for 

discomfort.





- Billing Disposition and Condition


Condition: GOOD


Disposition: SNF





The documentation as recorded by the Gunner oh Jennifer accurately reflects 

the service I personally performed and the decisions made by me, Jonah Barrera MD.

## 2020-02-16 ENCOUNTER — HOSPITAL ENCOUNTER (EMERGENCY)
Dept: HOSPITAL 25 - UCEAST | Age: 85
Discharge: ANOTHER HOSPITAL | End: 2020-02-16
Payer: MEDICARE

## 2020-02-16 ENCOUNTER — HOSPITAL ENCOUNTER (EMERGENCY)
Dept: HOSPITAL 25 - ED | Age: 85
Discharge: HOME | End: 2020-02-16
Payer: MEDICARE

## 2020-02-16 VITALS — SYSTOLIC BLOOD PRESSURE: 118 MMHG | DIASTOLIC BLOOD PRESSURE: 70 MMHG

## 2020-02-16 VITALS — DIASTOLIC BLOOD PRESSURE: 98 MMHG | SYSTOLIC BLOOD PRESSURE: 136 MMHG

## 2020-02-16 DIAGNOSIS — R07.9: Primary | ICD-10-CM

## 2020-02-16 DIAGNOSIS — E78.5: ICD-10-CM

## 2020-02-16 DIAGNOSIS — E78.00: ICD-10-CM

## 2020-02-16 DIAGNOSIS — I10: ICD-10-CM

## 2020-02-16 DIAGNOSIS — F03.90: ICD-10-CM

## 2020-02-16 DIAGNOSIS — Z87.891: ICD-10-CM

## 2020-02-16 DIAGNOSIS — K21.9: ICD-10-CM

## 2020-02-16 DIAGNOSIS — R94.31: ICD-10-CM

## 2020-02-16 DIAGNOSIS — Z96.641: ICD-10-CM

## 2020-02-16 LAB
ALBUMIN SERPL BCG-MCNC: 3.7 G/DL (ref 3.2–5.2)
ALBUMIN/GLOB SERPL: 1.2 {RATIO} (ref 1–3)
ALP SERPL-CCNC: 93 U/L (ref 34–104)
ALT SERPL W P-5'-P-CCNC: 13 U/L (ref 7–52)
ANION GAP SERPL CALC-SCNC: 3 MMOL/L (ref 2–11)
AST SERPL-CCNC: 16 U/L (ref 13–39)
BASOPHILS # BLD AUTO: 0.1 10^3/UL (ref 0–0.2)
BUN SERPL-MCNC: 13 MG/DL (ref 6–24)
BUN/CREAT SERPL: 14.4 (ref 8–20)
CALCIUM SERPL-MCNC: 9.2 MG/DL (ref 8.6–10.3)
CHLORIDE SERPL-SCNC: 108 MMOL/L (ref 101–111)
EOSINOPHIL # BLD AUTO: 0.2 10^3/UL (ref 0–0.6)
GLOBULIN SER CALC-MCNC: 3.1 G/DL (ref 2–4)
GLUCOSE SERPL-MCNC: 80 MG/DL (ref 70–100)
HCO3 SERPL-SCNC: 29 MMOL/L (ref 22–32)
HCT VFR BLD AUTO: 38 % (ref 35–47)
HGB BLD-MCNC: 12.6 G/DL (ref 12–16)
INR PPP/BLD: 0.97 (ref 0.82–1.09)
LYMPHOCYTES # BLD AUTO: 2.3 10^3/UL (ref 1–4.8)
MCH RBC QN AUTO: 29 PG (ref 27–31)
MCHC RBC AUTO-ENTMCNC: 34 G/DL (ref 31–36)
MCV RBC AUTO: 86 FL (ref 80–97)
MONOCYTES # BLD AUTO: 0.7 10^3/UL (ref 0–0.8)
NEUTROPHILS # BLD AUTO: 5.4 10^3/UL (ref 1.5–7.7)
NRBC # BLD AUTO: 0 10^3/UL
NRBC BLD QL AUTO: 0
PLATELET # BLD AUTO: 216 10^3/UL (ref 150–450)
POTASSIUM SERPL-SCNC: 4.4 MMOL/L (ref 3.5–5)
PROT SERPL-MCNC: 6.8 G/DL (ref 6.4–8.9)
RBC # BLD AUTO: 4.37 10^6 /UL (ref 3.7–4.87)
SODIUM SERPL-SCNC: 140 MMOL/L (ref 135–145)
TROPONIN I SERPL-MCNC: 0 NG/ML (ref ?–0.03)
WBC # BLD AUTO: 8.7 10^3/UL (ref 3.5–10.8)

## 2020-02-16 PROCEDURE — 93005 ELECTROCARDIOGRAM TRACING: CPT

## 2020-02-16 PROCEDURE — 85379 FIBRIN DEGRADATION QUANT: CPT

## 2020-02-16 PROCEDURE — 85025 COMPLETE CBC W/AUTO DIFF WBC: CPT

## 2020-02-16 PROCEDURE — 36415 COLL VENOUS BLD VENIPUNCTURE: CPT

## 2020-02-16 PROCEDURE — G0463 HOSPITAL OUTPT CLINIC VISIT: HCPCS

## 2020-02-16 PROCEDURE — 85610 PROTHROMBIN TIME: CPT

## 2020-02-16 PROCEDURE — 99283 EMERGENCY DEPT VISIT LOW MDM: CPT

## 2020-02-16 PROCEDURE — 71045 X-RAY EXAM CHEST 1 VIEW: CPT

## 2020-02-16 PROCEDURE — 80053 COMPREHEN METABOLIC PANEL: CPT

## 2020-02-16 PROCEDURE — 84484 ASSAY OF TROPONIN QUANT: CPT

## 2020-02-16 PROCEDURE — 99213 OFFICE O/P EST LOW 20 MIN: CPT

## 2020-02-16 NOTE — ED
HPI Chest Pain





- HPI Summary


HPI Summary: 


Patient is a 83 y/o F presenting to [facility] via EMS from convenient care for 

chief complaint of left anterior chest pain. It is reported that the patient 

had an episode of chest pain around a month ago. Over the past few days, the 

patient has had worsened chest pain. The patient was brought to urgent care by 

.  Pt with signifant dementia and unable to provider history.  

states pain is intermittent and she points to left anterior chest. No apparent 

sob, diaphoresis. no vomiting.  no tobacco use.  Pt unable to provide history. 


She was given  mg and sent to [facility] for further evaluation. Currently, 

no CP reported. Patient denies SOB and nausea. PHMx of Hx of HTN, HLD, GERD, 

dementia, hip replacement a few years ago reported. Home medications and 

allergies are reviewed. 





- History of Current Complaint


Chief Complaint: EDChestPainROMI


Time Seen by Provider: 02/16/20 12:51


Hx Obtained From: Patient, Medical Records - urgent care note from 2/16/20 

reviewed


Onset/Duration: Resolved


Timing: Intermittent


Current Severity: None


Pain Intensity: 0


Chest Pain Location: Left Anterior


Associated Signs and Symptoms: Positive: Chest Pain - since resolved.  Negative

: Shortness of Breath, Nausea





- Additional Pertinent History


Primary Care Physician: Dasha Bah





- Allergy/Home Medications


Allergies/Adverse Reactions: 


 Allergies











Allergy/AdvReac Type Severity Reaction Status Date / Time


 


No Known Allergies Allergy   Verified 02/16/20 12:53














PMH/Surg Hx/FS Hx/Imm Hx


Previously Healthy: Yes - history from [facility] and chart


Endocrine/Hematology History: 


   Denies: Hx Diabetes


Cardiovascular History: Reports: Hx Angina, Hx Hypercholesterolemia, Hx 

Hypertension - ON MEDICATION


   Denies: Hx Pacemaker/ICD


Respiratory History: 


   Denies: Hx Asthma


GI History: Reports: Hx Gastroesophageal Reflux Disease - TAKES PRILOSEC


 History: 


   Denies: Hx Renal Disease


Musculoskeletal History: Reports: Hx Back Problems - sciatica


   Comment Only: Hx Osteoporosis - PT DOESNT KNOW


Sensory History: Reports: Hx Contacts or Glasses


   Denies: Hx Hearing Aid


Opthamlomology History: Reports: Hx Contacts or Glasses


Neurological History: Reports: Hx Dementia


Psychiatric History: Reports: Hx Panic Disorder - MAY NEED  IN ROOM-

EXPLAINED WE DO TRY TO LIMIT AND WILL TRY WITHOUT





- Cancer History


Cancer Type, Location and Year: dementia


Hx Chemotherapy: No


Hx Radiation Therapy: No





- Surgical History


Surgery Procedure, Year, and Place: RIGHT HIP REPLACEMENT;.  LEFT ARM FRACTURE 

REPAIR;.  HYSTERECTOMY.  APPENDIX


Hx Anesthesia Reactions: No


Infectious Disease History: No


Infectious Disease History: 


   Denies: Hx Clostridium Difficile, Hx Hepatitis, Hx Human Immunodeficiency 

Virus (HIV), Hx of Known/Suspected MRSA, Hx Shingles, Hx Tuberculosis, Hx Known/

Suspected VRE, Hx Known/Suspected VRSA, History Other Infectious Disease, 

Traveled Outside the [place] in Last 30 Days





- Family History


Known Family History: Positive: Cardiac Disease, Non-Contributory





- Social History


Lives: With Family


Alcohol Use: Rare


Substance Use Type: Reports: None


Smoking Status (MU): Former Smoker


Type: Cigarettes


Length of Time of Smoking/Using Tobacco: 10 years


Have You Smoked in the Last Year: No





Review of Systems





- ROS Summary


Review of Systems Summary: 





limited second to dementia - level 5 caveat


Positive: Chest Pain - apparent


Negative: Shortness Of Breath


Negative: Nausea


All Other Systems Reviewed And Are Negative: Yes





Physical Exam





- Summary


Physical Exam Summary: 


 Vital Signs Reviewed: Yes


Alert to name, resistance to exam, no apparent pain states "no" to most 

questions


Eyes: Conjunctiva Clear, LOLA. EOM intact and full


ENT: Hearing grossly normal  TM x 2 clear, mmoist, uvula midline, no exudate, 

no erythema


Neck: Positive: Supple


Respiratory: Positive: No respiratory distress, No accessory muscle use + CTA 

throughout  no w/r


Cardiovascular: RRR  nl s1, s2  no m/r  CBT <2  sec, no edema


abd soft + BS nt/nd  no guarding, no distension


Musculoskeletal Exam: RODRIGUEZ x 4 without difficulty Strength Intact, ROM Intact, 

moves all ext, ambulatory without difficulty 


Neurological: Positive: Alert,  + sensation throughout


Psychological: Positive: Normal Response To evaluator


Skin: Positive: no rash, no ecchymosis


Vital Signs On Initial Exam: 


 Initial Vitals











Temp Pulse Resp BP Pulse Ox


 


 97.2 F   53   16   116/64   98 


 


 02/16/20 12:45  02/16/20 12:45  02/16/20 12:45  02/16/20 12:45  02/16/20 12:45














Procedures





- Sedation


Patient Received Moderate/Deep Sedation with Procedure: No





Diagnostics





- Vital Signs


 Vital Signs











  Temp Pulse Resp BP Pulse Ox


 


 02/16/20 13:00   52  15   99


 


 02/16/20 12:54   51    99


 


 02/16/20 12:52     116/64 


 


 02/16/20 12:45  97.2 F  53  16  116/64  98














- Laboratory


Lab Results: 


 Lab Results











  02/16/20 02/16/20 02/16/20 Range/Units





  13:08 13:08 13:08 


 


WBC  8.7    (3.5-10.8)  10^3/uL


 


RBC  4.37    (3.70-4.87)  10^6 /uL


 


Hgb  12.6    (12.0-16.0)  g/dL


 


Hct  38    (35-47)  %


 


MCV  86    (80-97)  fL


 


MCH  29    (27-31)  pg


 


MCHC  34    (31-36)  g/dL


 


RDW  14    (10-15)  %


 


Plt Count  216    (150-450)  10^3/uL


 


MPV  7.9    (7.4-10.4)  fL


 


Neut % (Auto)  61.4    %


 


Lymph % (Auto)  26.9    %


 


Mono % (Auto)  8.3    %


 


Eos % (Auto)  2.7    %


 


Baso % (Auto)  0.7    %


 


Absolute Neuts (auto)  5.4    (1.5-7.7)  10^3/ul


 


Absolute Lymphs (auto)  2.3    (1.0-4.8)  10^3/ul


 


Absolute Monos (auto)  0.7    (0-0.8)  10^3/ul


 


Absolute Eos (auto)  0.2    (0-0.6)  10^3/ul


 


Absolute Basos (auto)  0.1    (0-0.2)  10^3/ul


 


Absolute Nucleated RBC  0.0    10^3/ul


 


Nucleated RBC %  0.0    


 


INR (Anticoag Therapy)   0.97   (0.82-1.09)  


 


Sodium    140  (135-145)  mmol/L


 


Potassium    4.4  (3.5-5.0)  mmol/L


 


Chloride    108  (101-111)  mmol/L


 


Carbon Dioxide    29  (22-32)  mmol/L


 


Anion Gap    3  (2-11)  mmol/L


 


BUN    Pending  


 


Creatinine    Pending  


 


Est GFR ( Amer)    Pending  


 


Est GFR (Non-Af Amer)    Pending  


 


BUN/Creatinine Ratio    Pending  


 


Glucose    Pending  


 


Calcium    9.2  (8.6-10.3)  mg/dL


 


Total Bilirubin    0.30  (0.2-1.0)  mg/dL


 


AST    Pending  


 


ALT    Pending  


 


Alkaline Phosphatase    Pending  


 


Troponin I    Pending  


 


Total Protein    Pending  


 


Albumin    3.7  (3.2-5.2)  g/dL


 


Globulin    Pending  


 


Albumin/Globulin Ratio    Pending  











Result Diagrams: 


 02/16/20 13:08





 02/16/20 13:08


Lab Statement: Any lab studies that have been ordered have been reviewed, and 

results considered in the medical decision making process.





- Radiology


  ** CXR


Radiology Interpretation Completed By: Radiologist


Summary of Radiographic Findings: IMPRESSION: NO ACTIVE CARDIOPULMONARY DISEASE 

IS NOTED. THIS REPORT WAS REVIEWED BY ED PHYSICIAN.





- EKG


  ** 1247


Cardiac Rate: Bradycardia - rate of 50 BPM


EKG Rhythm: Sinus Bradycardia


EKG Comparison: No Significant Change - No other changes compared to 11/14/15 

EKG with exception of rate.


Summary of EKG Findings: EKG showed sinus bradycardia with rate of 50 BPM, 

inverted T-waves in lead III, no acute ST-T wave changes noted. No other 

changes compared to 11/14/15 EKG with exception of rate. ED physician has 

reviewed and interpreted this EKG.





Re-Evaluation





- Re-Evaluation


  ** First Eval


Re-Evaluation Time: 14:23


Comment:  in the room, workup was discussed. Patient discharged to home 

and will follow up with PCP.





Chest Pain Course/Dx





- Course


Course Of Treatment: PT present to ED from  for eval of chest pain. Pt with 

dementia- history from  - caretaker. STates pt with episodes of apparent 

discomfort for 1 month - last episode this am - approx 830am Pt unable to give 

hx.  on exam vss.  No cocnerning findings.  no acute changes ekg.  will check 

cxr, trop.  if neg, anticiapte discharge with PCP fu.   agreeable to plan





- Diagnoses


Provider Diagnoses: 


 Chest pain








Discharge ED





- Sign-Out/Discharge


Documenting (check all that apply): Patient Departure





- Discharge Plan


Condition: Stable


Disposition: HOME


Patient Education Materials:  Chest Pain (ED)


Referrals: 


Dasha Briones NP [Primary Care Provider] - 


Additional Instructions: 


- stay well hydrated. Drink plenty of non-alcoholic, non-caffinated beverages


- today's evaluation did not show any concerning findings for heart cause  - it 

is recommended you contact  her primary care doctor tomorrow to schedule a 

follow-up appointment this week 


If there is recurrent or increased pain, it is recommended you return to the 

emergency department for further evaluation and treatment





- Billing Disposition and Condition


Condition: STABLE


Disposition: Home





- Attestation Statements


Document Initiated by Kaye: Yes


Documenting Scribe: DASHA GATICA


Provider For Whom Kaye is Documenting (Include Credential): PAUL CRAIG MD


Scribe Attestation: 


IDASHA, scribed for PAUL CRAIG MD on 02/17/20 at 1626. 


Scribe Documentation Reviewed: Yes


Provider Attestation: 


The documentation as recorded by the DASHA oh accurately reflects 

the service I personally performed and the decisions made by me, PAUL CRAIG MD


Status of Scribe Document: Viewed

## 2020-02-16 NOTE — UC
UC General HPI





- HPI Summary


HPI Summary: 





RN triage reviewed - pain under left breast  concerned cardiac pt 

demented and refusing ekg changing into gown became very angry with  who 

attempted to help her into gown will not compy with nursing





85 yo female presents with 


c/o progressively increased left ant cp. 


First episode noticed was approx 1 mo ago. however, over the last several days, 

increasing c/p pain. 


No associated sob / vomit / fever / rash. 


Difficult to assess length of time / pain scale etc.  


Stopped all blood thinners, including asa last year. 


+ hx dementia, per her , this has not changed. 














- History of Current Complaint


Chief Complaint: UCChestPain


Stated Complaint: CHEST PAIN


Time Seen by Provider: 02/16/20 11:22


Hx Obtained From: Patient, Family/Caretaker


Pain Intensity: 3





- Allergy/Home Medications


Allergies/Adverse Reactions: 


 Allergies











Allergy/AdvReac Type Severity Reaction Status Date / Time


 


No Known Allergies Allergy   Verified 06/25/18 10:30














PMH/Surg Hx/FS Hx/Imm Hx


Previously Healthy: No - see below and see hpi





- Surgical History


Surgical History: Yes


Surgery Procedure, Year, and Place: RIGHT HIP REPLACEMENT;.  LEFT ARM FRACTURE 

REPAIR;.  HYSTERECTOMY.  APPENDIX





- Family History


Known Family History: Positive: Non-Contributory





- Social History


Alcohol Use: None


Substance Use Type: None


Smoking Status (MU): Former Smoker


Type: Cigarettes


Length of Time of Smoking/Using Tobacco: 10 years


Have You Smoked in the Last Year: No


When Did the Patient Quit Smoking/Using Tobacco: YEARS AGO





- Immunization History


Most Recent Influenza Vaccination: 2015


Most Recent Tetanus Shot:  STAT[facility] UTD


Most Recent Pneumonia Vaccination: 2015





Review of Systems


All Other Systems Reviewed And Are Negative: Yes


Constitutional: Positive: Negative


Skin: Positive: Negative


Eyes: Positive: Negative


ENT: Positive: Negative


Respiratory: Positive: Other - see hpi


Cardiovascular: Positive: Other - see hpi


Genitourinary: Positive: Negative


Motor: Positive: Negative


Neurovascular: Positive: Negative


Musculoskeletal: Positive: Negative


Neurological/Mental Status: Positive: Negative - see hpi


Psychological: Positive: Negative


Is Patient Immunocompromised?: No





- Comments


Additional Review of Systems Comments: 





unable to obtain 1st person ROS -> ROS as possible per spouse





Physical Exam


Triage Information Reviewed: Yes


Appearance: Well-Nourished - lying back, seems relaxed, nontoxic, nad


Vital Signs: 


 Initial Vital Signs











Temp  98.2 F   02/16/20 11:09


 


Pulse  65   02/16/20 11:09


 


Resp  16   02/16/20 11:09


 


BP  110/60   02/16/20 11:09


 


Pulse Ox  98   02/16/20 11:09











Vital Signs Reviewed: Yes


Eye Exam: Normal


ENT Exam: Normal - mmm  trachea midline facial expressions grossly symmetric


Neck exam: Normal - nad


Respiratory: Positive: Chest non-tender, Lungs clear, Normal breath sounds, No 

respiratory distress, No accessory muscle use


Cardiovascular Exam: Other - HR regular, slight systolic M LSB HR correlates 

with radial L pulse


Cardiovascular: Positive: Brisk Capillary Refill


Abdominal Exam: Normal - no direct tenderness elicited, no cvat noted however, 

pt does wince, but this may be cold (hands / stethoscope) related


Musculoskeletal Exam: Other - moves x 4 ext's


Musculoskeletal: Positive: Edema @ - BLE 3+ pe, distal ext warm to touch


Psychological Exam: Other - normal per spouse


Skin Exam: Normal - nondiaphoretic  no visible or reported rash





Course/Dx





- Course


Course Of Treatment: 








11:52 D/w GIANCARLO Yoon ED











SR at 59bpm pr 154


qtc 423


c/w ekg 11/2015 - ns t changes AL leads





- Diagnoses


Provider Diagnosis: 


 Chest pain








Discharge ED





- Sign-Out/Discharge


Documenting (check all that apply): Patient Departure


All imaging exams completed and their final reports reviewed: No Studies





- Discharge Plan


Condition: Guarded


Disposition: ADMITTED TO [facility]


Patient Education Materials:  Chest Pain (ED)


Referrals: 


Dasha Briones NP [Primary Care Provider] - 





- Billing Disposition and Condition


Condition: GUARDED


Disposition: Admitted to [facility]